# Patient Record
Sex: MALE | Race: WHITE | Employment: OTHER | URBAN - METROPOLITAN AREA
[De-identification: names, ages, dates, MRNs, and addresses within clinical notes are randomized per-mention and may not be internally consistent; named-entity substitution may affect disease eponyms.]

---

## 2017-10-24 ENCOUNTER — HOSPITAL ENCOUNTER (OUTPATIENT)
Dept: PULMONOLOGY | Age: 77
Discharge: HOME | End: 2017-10-24
Payer: MEDICARE

## 2017-10-24 PROCEDURE — 94726 PLETHYSMOGRAPHY LUNG VOLUMES: CPT

## 2017-10-24 PROCEDURE — 94060 EVALUATION OF WHEEZING: CPT

## 2017-10-24 PROCEDURE — 6360000002 HC RX W HCPCS: Performed by: INTERNAL MEDICINE

## 2017-10-24 PROCEDURE — 94729 DIFFUSING CAPACITY: CPT

## 2017-10-24 RX ORDER — ALBUTEROL SULFATE 2.5 MG/3ML
2.5 SOLUTION RESPIRATORY (INHALATION) ONCE
Status: COMPLETED | OUTPATIENT
Start: 2017-10-24 | End: 2017-10-24

## 2017-10-24 RX ADMIN — ALBUTEROL SULFATE 2.5 MG: 2.5 SOLUTION RESPIRATORY (INHALATION) at 16:27

## 2017-10-30 PROCEDURE — 94729 DIFFUSING CAPACITY: CPT | Performed by: INTERNAL MEDICINE

## 2017-10-30 PROCEDURE — 94726 PLETHYSMOGRAPHY LUNG VOLUMES: CPT | Performed by: INTERNAL MEDICINE

## 2017-10-30 PROCEDURE — 94010 BREATHING CAPACITY TEST: CPT | Performed by: INTERNAL MEDICINE

## 2018-04-24 ENCOUNTER — HOSPITAL ENCOUNTER (OUTPATIENT)
Dept: PULMONOLOGY | Age: 78
Discharge: HOME | End: 2018-04-24
Payer: MEDICARE

## 2019-01-01 ENCOUNTER — APPOINTMENT (OUTPATIENT)
Dept: GENERAL RADIOLOGY | Age: 79
DRG: 683 | End: 2019-01-01
Payer: MEDICARE

## 2019-01-01 ENCOUNTER — HOSPITAL ENCOUNTER (OUTPATIENT)
Dept: PHYSICAL THERAPY | Age: 79
Setting detail: THERAPIES SERIES
Discharge: HOME | End: 2019-07-19
Payer: MEDICARE

## 2019-01-01 ENCOUNTER — HOSPITAL ENCOUNTER (OUTPATIENT)
Dept: PHYSICAL THERAPY | Age: 79
Setting detail: THERAPIES SERIES
Discharge: HOME | End: 2019-07-09
Payer: MEDICARE

## 2019-01-01 ENCOUNTER — APPOINTMENT (OUTPATIENT)
Dept: NUCLEAR MEDICINE | Age: 79
DRG: 683 | End: 2019-01-01
Payer: MEDICARE

## 2019-01-01 ENCOUNTER — APPOINTMENT (OUTPATIENT)
Dept: CARDIAC CATH/INVASIVE PROCEDURES | Age: 79
DRG: 683 | End: 2019-01-01
Payer: MEDICARE

## 2019-01-01 ENCOUNTER — HOSPITAL ENCOUNTER (OUTPATIENT)
Dept: PHYSICAL THERAPY | Age: 79
Setting detail: THERAPIES SERIES
End: 2019-01-01
Payer: MEDICARE

## 2019-01-01 ENCOUNTER — HOSPITAL ENCOUNTER (EMERGENCY)
Age: 79
End: 2019-07-24
Attending: EMERGENCY MEDICINE
Payer: MEDICARE

## 2019-01-01 ENCOUNTER — HOSPITAL ENCOUNTER (INPATIENT)
Age: 79
LOS: 4 days | Discharge: ANOTHER HOSPITAL | DRG: 683 | End: 2019-06-19
Attending: INTERNAL MEDICINE | Admitting: INTERNAL MEDICINE
Payer: MEDICARE

## 2019-01-01 ENCOUNTER — HOSPITAL ENCOUNTER (OUTPATIENT)
Dept: PHYSICAL THERAPY | Age: 79
Setting detail: THERAPIES SERIES
Discharge: HOME | End: 2019-07-11
Payer: MEDICARE

## 2019-01-01 ENCOUNTER — APPOINTMENT (OUTPATIENT)
Dept: CT IMAGING | Age: 79
DRG: 683 | End: 2019-01-01
Payer: MEDICARE

## 2019-01-01 ENCOUNTER — HOSPITAL ENCOUNTER (OUTPATIENT)
Dept: PHYSICAL THERAPY | Age: 79
Setting detail: THERAPIES SERIES
Discharge: HOME | End: 2019-07-16
Payer: MEDICARE

## 2019-01-01 VITALS
BODY MASS INDEX: 28.77 KG/M2 | OXYGEN SATURATION: 100 % | RESPIRATION RATE: 18 BRPM | WEIGHT: 205.47 LBS | DIASTOLIC BLOOD PRESSURE: 49 MMHG | SYSTOLIC BLOOD PRESSURE: 103 MMHG | HEART RATE: 57 BPM | HEIGHT: 71 IN | TEMPERATURE: 98.2 F

## 2019-01-01 VITALS — WEIGHT: 205 LBS | BODY MASS INDEX: 28.59 KG/M2

## 2019-01-01 DIAGNOSIS — N17.9 AKI (ACUTE KIDNEY INJURY) (HCC): ICD-10-CM

## 2019-01-01 DIAGNOSIS — I46.9 CARDIAC ARREST (HCC): Primary | ICD-10-CM

## 2019-01-01 DIAGNOSIS — R55 SYNCOPE AND COLLAPSE: ICD-10-CM

## 2019-01-01 DIAGNOSIS — I47.20 VENTRICULAR TACHYCARDIA: Primary | ICD-10-CM

## 2019-01-01 DIAGNOSIS — E86.0 DEHYDRATION: ICD-10-CM

## 2019-01-01 LAB
ALBUMIN SERPL-MCNC: 2.9 G/DL (ref 3.5–4.6)
ALBUMIN SERPL-MCNC: 3.2 G/DL (ref 3.5–4.6)
ALBUMIN SERPL-MCNC: 3.3 G/DL (ref 3.5–4.6)
ALBUMIN SERPL-MCNC: 3.7 G/DL (ref 3.5–4.6)
ALP BLD-CCNC: 120 U/L (ref 35–104)
ALP BLD-CCNC: 135 U/L (ref 35–104)
ALP BLD-CCNC: 139 U/L (ref 35–104)
ALP BLD-CCNC: 165 U/L (ref 35–104)
ALT SERPL-CCNC: 13 U/L (ref 0–41)
ALT SERPL-CCNC: 15 U/L (ref 0–41)
ALT SERPL-CCNC: 18 U/L (ref 0–41)
ALT SERPL-CCNC: 20 U/L (ref 0–41)
AMPHETAMINE SCREEN, URINE: ABNORMAL
ANION GAP SERPL CALCULATED.3IONS-SCNC: 11 MEQ/L (ref 9–15)
ANION GAP SERPL CALCULATED.3IONS-SCNC: 11 MEQ/L (ref 9–15)
ANION GAP SERPL CALCULATED.3IONS-SCNC: 12 MEQ/L (ref 9–15)
ANION GAP SERPL CALCULATED.3IONS-SCNC: 12 MEQ/L (ref 9–15)
ANION GAP SERPL CALCULATED.3IONS-SCNC: 14 MEQ/L (ref 9–15)
ANION GAP SERPL CALCULATED.3IONS-SCNC: 14 MEQ/L (ref 9–15)
APTT: 124 SEC (ref 21.6–35.4)
APTT: 124 SEC (ref 21.6–35.4)
APTT: 22.9 SEC (ref 21.6–35.4)
AST SERPL-CCNC: 13 U/L (ref 0–40)
AST SERPL-CCNC: 15 U/L (ref 0–40)
AST SERPL-CCNC: 19 U/L (ref 0–40)
AST SERPL-CCNC: 19 U/L (ref 0–40)
BARBITURATE SCREEN URINE: ABNORMAL
BASOPHILS ABSOLUTE: 0 K/UL (ref 0–0.2)
BASOPHILS ABSOLUTE: 0.1 K/UL (ref 0–0.2)
BASOPHILS ABSOLUTE: 0.1 K/UL (ref 0–0.2)
BASOPHILS RELATIVE PERCENT: 0.8 %
BASOPHILS RELATIVE PERCENT: 0.8 %
BASOPHILS RELATIVE PERCENT: 0.9 %
BENZODIAZEPINE SCREEN, URINE: POSITIVE
BILIRUB SERPL-MCNC: 0.9 MG/DL (ref 0.2–0.7)
BILIRUB SERPL-MCNC: 1 MG/DL (ref 0.2–0.7)
BILIRUB SERPL-MCNC: 1.1 MG/DL (ref 0.2–0.7)
BILIRUB SERPL-MCNC: 1.3 MG/DL (ref 0.2–0.7)
BILIRUBIN URINE: NEGATIVE
BLOOD, URINE: NEGATIVE
BUN BLDV-MCNC: 32 MG/DL (ref 8–23)
BUN BLDV-MCNC: 34 MG/DL (ref 8–23)
BUN BLDV-MCNC: 35 MG/DL (ref 8–23)
BUN BLDV-MCNC: 35 MG/DL (ref 8–23)
C-REACTIVE PROTEIN, HIGH SENSITIVITY: 17.2 MG/L (ref 0–5)
CALCIUM SERPL-MCNC: 8.1 MG/DL (ref 8.5–9.9)
CALCIUM SERPL-MCNC: 8.3 MG/DL (ref 8.5–9.9)
CALCIUM SERPL-MCNC: 8.3 MG/DL (ref 8.5–9.9)
CALCIUM SERPL-MCNC: 8.4 MG/DL (ref 8.5–9.9)
CALCIUM SERPL-MCNC: 8.4 MG/DL (ref 8.5–9.9)
CALCIUM SERPL-MCNC: 8.6 MG/DL (ref 8.5–9.9)
CANNABINOID SCREEN URINE: ABNORMAL
CHLORIDE BLD-SCNC: 100 MEQ/L (ref 95–107)
CHLORIDE BLD-SCNC: 102 MEQ/L (ref 95–107)
CHLORIDE BLD-SCNC: 103 MEQ/L (ref 95–107)
CHLORIDE BLD-SCNC: 95 MEQ/L (ref 95–107)
CHLORIDE BLD-SCNC: 98 MEQ/L (ref 95–107)
CHLORIDE BLD-SCNC: 99 MEQ/L (ref 95–107)
CHOLESTEROL, TOTAL: 66 MG/DL (ref 0–199)
CHP ED QC CHECK: NORMAL
CK MB: 6.1 NG/ML (ref 0–6.7)
CLARITY: CLEAR
CO2: 25 MEQ/L (ref 20–31)
CO2: 25 MEQ/L (ref 20–31)
CO2: 26 MEQ/L (ref 20–31)
CO2: 26 MEQ/L (ref 20–31)
CO2: 29 MEQ/L (ref 20–31)
CO2: 29 MEQ/L (ref 20–31)
COCAINE METABOLITE SCREEN URINE: ABNORMAL
COLOR: YELLOW
CREAT SERPL-MCNC: 1.71 MG/DL (ref 0.7–1.2)
CREAT SERPL-MCNC: 1.88 MG/DL (ref 0.7–1.2)
CREAT SERPL-MCNC: 1.99 MG/DL (ref 0.7–1.2)
CREAT SERPL-MCNC: 2.06 MG/DL (ref 0.7–1.2)
CREAT SERPL-MCNC: 2.06 MG/DL (ref 0.7–1.2)
CREAT SERPL-MCNC: 2.08 MG/DL (ref 0.7–1.2)
CREATINE KINASE-MB INDEX: 3 % (ref 0–3.5)
D DIMER: 0.99 MG/L FEU (ref 0–0.5)
EKG ATRIAL RATE: 234 BPM
EKG ATRIAL RATE: 60 BPM
EKG ATRIAL RATE: 62 BPM
EKG ATRIAL RATE: 63 BPM
EKG P AXIS: -29 DEGREES
EKG P AXIS: -47 DEGREES
EKG P-R INTERVAL: 270 MS
EKG P-R INTERVAL: 280 MS
EKG P-R INTERVAL: 286 MS
EKG P-R INTERVAL: 294 MS
EKG Q-T INTERVAL: 478 MS
EKG Q-T INTERVAL: 492 MS
EKG Q-T INTERVAL: 502 MS
EKG Q-T INTERVAL: 510 MS
EKG QRS DURATION: 132 MS
EKG QRS DURATION: 134 MS
EKG QRS DURATION: 144 MS
EKG QRS DURATION: 148 MS
EKG QTC CALCULATION (BAZETT): 481 MS
EKG QTC CALCULATION (BAZETT): 499 MS
EKG QTC CALCULATION (BAZETT): 505 MS
EKG QTC CALCULATION (BAZETT): 521 MS
EKG R AXIS: 21 DEGREES
EKG R AXIS: 45 DEGREES
EKG R AXIS: 46 DEGREES
EKG R AXIS: 56 DEGREES
EKG T AXIS: -84 DEGREES
EKG T AXIS: 166 DEGREES
EKG T AXIS: 169 DEGREES
EKG T AXIS: 263 DEGREES
EKG VENTRICULAR RATE: 61 BPM
EKG VENTRICULAR RATE: 61 BPM
EKG VENTRICULAR RATE: 62 BPM
EKG VENTRICULAR RATE: 63 BPM
EOSINOPHILS ABSOLUTE: 0 K/UL (ref 0–0.7)
EOSINOPHILS ABSOLUTE: 0.1 K/UL (ref 0–0.7)
EOSINOPHILS ABSOLUTE: 0.1 K/UL (ref 0–0.7)
EOSINOPHILS RELATIVE PERCENT: 0.7 %
EOSINOPHILS RELATIVE PERCENT: 1.2 %
EOSINOPHILS RELATIVE PERCENT: 2.3 %
ETHANOL PERCENT: NORMAL G/DL
ETHANOL: <10 MG/DL (ref 0–0.08)
FERRITIN: 34.7 NG/ML (ref 30–400)
GFR AFRICAN AMERICAN: 37.5
GFR AFRICAN AMERICAN: 38
GFR AFRICAN AMERICAN: 38
GFR AFRICAN AMERICAN: 39.5
GFR AFRICAN AMERICAN: 42.2
GFR AFRICAN AMERICAN: 47.1
GFR NON-AFRICAN AMERICAN: 31
GFR NON-AFRICAN AMERICAN: 31.4
GFR NON-AFRICAN AMERICAN: 31.4
GFR NON-AFRICAN AMERICAN: 32.6
GFR NON-AFRICAN AMERICAN: 34.9
GFR NON-AFRICAN AMERICAN: 38.9
GLOBULIN: 2.2 G/DL (ref 2.3–3.5)
GLOBULIN: 2.5 G/DL (ref 2.3–3.5)
GLOBULIN: 2.5 G/DL (ref 2.3–3.5)
GLOBULIN: 2.7 G/DL (ref 2.3–3.5)
GLUCOSE BLD-MCNC: 104 MG/DL (ref 60–115)
GLUCOSE BLD-MCNC: 144 MG/DL (ref 60–115)
GLUCOSE BLD-MCNC: 158 MG/DL (ref 70–99)
GLUCOSE BLD-MCNC: 165 MG/DL (ref 60–115)
GLUCOSE BLD-MCNC: 168 MG/DL (ref 60–115)
GLUCOSE BLD-MCNC: 169 MG/DL (ref 70–99)
GLUCOSE BLD-MCNC: 172 MG/DL (ref 60–115)
GLUCOSE BLD-MCNC: 176 MG/DL (ref 60–115)
GLUCOSE BLD-MCNC: 176 MG/DL (ref 70–99)
GLUCOSE BLD-MCNC: 177 MG/DL (ref 60–115)
GLUCOSE BLD-MCNC: 192 MG/DL (ref 70–99)
GLUCOSE BLD-MCNC: 197 MG/DL (ref 60–115)
GLUCOSE BLD-MCNC: 203 MG/DL (ref 60–115)
GLUCOSE BLD-MCNC: 209 MG/DL (ref 60–115)
GLUCOSE BLD-MCNC: 219 MG/DL (ref 60–115)
GLUCOSE BLD-MCNC: 220 MG/DL (ref 60–115)
GLUCOSE BLD-MCNC: 236 MG/DL (ref 70–99)
GLUCOSE BLD-MCNC: 238 MG/DL (ref 60–115)
GLUCOSE BLD-MCNC: 240 MG/DL (ref 60–115)
GLUCOSE BLD-MCNC: 242 MG/DL
GLUCOSE BLD-MCNC: 258 MG/DL (ref 60–115)
GLUCOSE BLD-MCNC: 96 MG/DL (ref 70–99)
GLUCOSE URINE: NEGATIVE MG/DL
HBA1C MFR BLD: 7.7 % (ref 4.8–5.9)
HCT VFR BLD CALC: 26.2 % (ref 42–52)
HCT VFR BLD CALC: 28.6 % (ref 42–52)
HCT VFR BLD CALC: 29 % (ref 42–52)
HCT VFR BLD CALC: 29.1 % (ref 42–52)
HCT VFR BLD CALC: 29.1 % (ref 42–52)
HCT VFR BLD CALC: 31.8 % (ref 42–52)
HDLC SERPL-MCNC: 29 MG/DL (ref 40–59)
HEMOGLOBIN: 10.2 G/DL (ref 14–18)
HEMOGLOBIN: 8.7 G/DL (ref 14–18)
HEMOGLOBIN: 9.3 G/DL (ref 14–18)
HEMOGLOBIN: 9.4 G/DL (ref 14–18)
HEMOGLOBIN: 9.6 G/DL (ref 14–18)
HEMOGLOBIN: 9.6 G/DL (ref 14–18)
INR BLD: 1.2
IRON SATURATION: 8 % (ref 11–46)
IRON: 29 UG/DL (ref 59–158)
KETONES, URINE: ABNORMAL MG/DL
LDL CHOLESTEROL CALCULATED: 27 MG/DL (ref 0–129)
LEUKOCYTE ESTERASE, URINE: NEGATIVE
LV EF: 15 %
LVEF MODALITY: NORMAL
LYMPHOCYTES ABSOLUTE: 0.6 K/UL (ref 1–4.8)
LYMPHOCYTES ABSOLUTE: 0.7 K/UL (ref 1–4.8)
LYMPHOCYTES ABSOLUTE: 0.9 K/UL (ref 1–4.8)
LYMPHOCYTES RELATIVE PERCENT: 11 %
LYMPHOCYTES RELATIVE PERCENT: 11.9 %
LYMPHOCYTES RELATIVE PERCENT: 8.3 %
Lab: ABNORMAL
MAGNESIUM: 1.7 MG/DL (ref 1.7–2.4)
MAGNESIUM: 1.8 MG/DL (ref 1.7–2.4)
MAGNESIUM: 2.1 MG/DL (ref 1.7–2.4)
MCH RBC QN AUTO: 28 PG (ref 27–31.3)
MCH RBC QN AUTO: 28.1 PG (ref 27–31.3)
MCH RBC QN AUTO: 28.2 PG (ref 27–31.3)
MCH RBC QN AUTO: 28.2 PG (ref 27–31.3)
MCH RBC QN AUTO: 28.4 PG (ref 27–31.3)
MCH RBC QN AUTO: 28.6 PG (ref 27–31.3)
MCHC RBC AUTO-ENTMCNC: 32.1 % (ref 33–37)
MCHC RBC AUTO-ENTMCNC: 32.1 % (ref 33–37)
MCHC RBC AUTO-ENTMCNC: 32.2 % (ref 33–37)
MCHC RBC AUTO-ENTMCNC: 33 % (ref 33–37)
MCHC RBC AUTO-ENTMCNC: 33.1 % (ref 33–37)
MCHC RBC AUTO-ENTMCNC: 33.4 % (ref 33–37)
MCV RBC AUTO: 84.9 FL (ref 80–100)
MCV RBC AUTO: 85.3 FL (ref 80–100)
MCV RBC AUTO: 85.7 FL (ref 80–100)
MCV RBC AUTO: 86.8 FL (ref 80–100)
MCV RBC AUTO: 87.7 FL (ref 80–100)
MCV RBC AUTO: 88.6 FL (ref 80–100)
MONOCYTES ABSOLUTE: 0.4 K/UL (ref 0.2–0.8)
MONOCYTES ABSOLUTE: 0.5 K/UL (ref 0.2–0.8)
MONOCYTES ABSOLUTE: 0.6 K/UL (ref 0.2–0.8)
MONOCYTES RELATIVE PERCENT: 6.5 %
MONOCYTES RELATIVE PERCENT: 7.1 %
MONOCYTES RELATIVE PERCENT: 8.2 %
NEUTROPHILS ABSOLUTE: 4.7 K/UL (ref 1.4–6.5)
NEUTROPHILS ABSOLUTE: 6 K/UL (ref 1.4–6.5)
NEUTROPHILS ABSOLUTE: 6.1 K/UL (ref 1.4–6.5)
NEUTROPHILS RELATIVE PERCENT: 78.5 %
NEUTROPHILS RELATIVE PERCENT: 78.8 %
NEUTROPHILS RELATIVE PERCENT: 83 %
NITRITE, URINE: NEGATIVE
OPIATE SCREEN URINE: ABNORMAL
PDW BLD-RTO: 16.8 % (ref 11.5–14.5)
PDW BLD-RTO: 16.9 % (ref 11.5–14.5)
PDW BLD-RTO: 17 % (ref 11.5–14.5)
PDW BLD-RTO: 17.1 % (ref 11.5–14.5)
PDW BLD-RTO: 17.2 % (ref 11.5–14.5)
PDW BLD-RTO: 17.2 % (ref 11.5–14.5)
PERFORMED ON: ABNORMAL
PERFORMED ON: NORMAL
PH UA: 5 (ref 5–9)
PHENCYCLIDINE SCREEN URINE: ABNORMAL
PLATELET # BLD: 122 K/UL (ref 130–400)
PLATELET # BLD: 123 K/UL (ref 130–400)
PLATELET # BLD: 124 K/UL (ref 130–400)
PLATELET # BLD: 124 K/UL (ref 130–400)
PLATELET # BLD: 130 K/UL (ref 130–400)
PLATELET # BLD: 131 K/UL (ref 130–400)
POTASSIUM REFLEX MAGNESIUM: 4.4 MEQ/L (ref 3.4–4.9)
POTASSIUM SERPL-SCNC: 3.2 MEQ/L (ref 3.4–4.9)
POTASSIUM SERPL-SCNC: 3.5 MEQ/L (ref 3.4–4.9)
POTASSIUM SERPL-SCNC: 4 MEQ/L (ref 3.4–4.9)
POTASSIUM SERPL-SCNC: 4.2 MEQ/L (ref 3.4–4.9)
POTASSIUM SERPL-SCNC: 5.3 MEQ/L (ref 3.4–4.9)
PRO-BNP: 6468 PG/ML
PRO-BNP: 7975 PG/ML
PROTEIN UA: NEGATIVE MG/DL
PROTHROMBIN TIME: 12.3 SEC (ref 9–11.5)
RBC # BLD: 3.09 M/UL (ref 4.7–6.1)
RBC # BLD: 3.31 M/UL (ref 4.7–6.1)
RBC # BLD: 3.34 M/UL (ref 4.7–6.1)
RBC # BLD: 3.35 M/UL (ref 4.7–6.1)
RBC # BLD: 3.4 M/UL (ref 4.7–6.1)
RBC # BLD: 3.59 M/UL (ref 4.7–6.1)
SEDIMENTATION RATE, ERYTHROCYTE: 20 MM (ref 0–20)
SODIUM BLD-SCNC: 138 MEQ/L (ref 135–144)
SODIUM BLD-SCNC: 139 MEQ/L (ref 135–144)
SPECIFIC GRAVITY UA: 1.02 (ref 1–1.03)
T4 FREE: 1.59 NG/DL (ref 0.84–1.68)
TOTAL CK: 206 U/L (ref 0–190)
TOTAL IRON BINDING CAPACITY: 356 UG/DL (ref 178–450)
TOTAL PROTEIN: 5.4 G/DL (ref 6.3–8)
TOTAL PROTEIN: 5.5 G/DL (ref 6.3–8)
TOTAL PROTEIN: 5.7 G/DL (ref 6.3–8)
TOTAL PROTEIN: 6.4 G/DL (ref 6.3–8)
TRIGL SERPL-MCNC: 51 MG/DL (ref 0–150)
TROPONIN: 0.02 NG/ML (ref 0–0.01)
TROPONIN: 0.02 NG/ML (ref 0–0.01)
TROPONIN: 0.03 NG/ML (ref 0–0.01)
TROPONIN: <0.01 NG/ML (ref 0–0.01)
TROPONIN: <0.01 NG/ML (ref 0–0.01)
TSH REFLEX: 17.13 UIU/ML (ref 0.44–3.86)
URINE REFLEX TO CULTURE: ABNORMAL
UROBILINOGEN, URINE: 0.2 E.U./DL
WBC # BLD: 5.9 K/UL (ref 4.8–10.8)
WBC # BLD: 6.3 K/UL (ref 4.8–10.8)
WBC # BLD: 6.5 K/UL (ref 4.8–10.8)
WBC # BLD: 7.2 K/UL (ref 4.8–10.8)
WBC # BLD: 7.8 K/UL (ref 4.8–10.8)
WBC # BLD: 9.3 K/UL (ref 4.8–10.8)

## 2019-01-01 PROCEDURE — 2060000000 HC ICU INTERMEDIATE R&B

## 2019-01-01 PROCEDURE — 6370000000 HC RX 637 (ALT 250 FOR IP): Performed by: INTERNAL MEDICINE

## 2019-01-01 PROCEDURE — 71045 X-RAY EXAM CHEST 1 VIEW: CPT

## 2019-01-01 PROCEDURE — 97116 GAIT TRAINING THERAPY: CPT

## 2019-01-01 PROCEDURE — 6370000000 HC RX 637 (ALT 250 FOR IP): Performed by: PHYSICIAN ASSISTANT

## 2019-01-01 PROCEDURE — 85379 FIBRIN DEGRADATION QUANT: CPT

## 2019-01-01 PROCEDURE — 36415 COLL VENOUS BLD VENIPUNCTURE: CPT

## 2019-01-01 PROCEDURE — 96360 HYDRATION IV INFUSION INIT: CPT

## 2019-01-01 PROCEDURE — 82728 ASSAY OF FERRITIN: CPT

## 2019-01-01 PROCEDURE — 6360000002 HC RX W HCPCS: Performed by: PHYSICIAN ASSISTANT

## 2019-01-01 PROCEDURE — 2580000003 HC RX 258: Performed by: PHYSICIAN ASSISTANT

## 2019-01-01 PROCEDURE — 84484 ASSAY OF TROPONIN QUANT: CPT

## 2019-01-01 PROCEDURE — 97110 THERAPEUTIC EXERCISES: CPT

## 2019-01-01 PROCEDURE — 85027 COMPLETE CBC AUTOMATED: CPT

## 2019-01-01 PROCEDURE — 82550 ASSAY OF CK (CPK): CPT

## 2019-01-01 PROCEDURE — 6360000002 HC RX W HCPCS: Performed by: INTERNAL MEDICINE

## 2019-01-01 PROCEDURE — 93005 ELECTROCARDIOGRAM TRACING: CPT | Performed by: PHYSICIAN ASSISTANT

## 2019-01-01 PROCEDURE — 2580000003 HC RX 258

## 2019-01-01 PROCEDURE — 80053 COMPREHEN METABOLIC PANEL: CPT

## 2019-01-01 PROCEDURE — 72125 CT NECK SPINE W/O DYE: CPT

## 2019-01-01 PROCEDURE — 83540 ASSAY OF IRON: CPT

## 2019-01-01 PROCEDURE — 2580000003 HC RX 258: Performed by: INTERNAL MEDICINE

## 2019-01-01 PROCEDURE — 93005 ELECTROCARDIOGRAM TRACING: CPT | Performed by: INTERNAL MEDICINE

## 2019-01-01 PROCEDURE — 93459 L HRT ART/GRFT ANGIO: CPT | Performed by: INTERNAL MEDICINE

## 2019-01-01 PROCEDURE — 6360000002 HC RX W HCPCS: Performed by: EMERGENCY MEDICINE

## 2019-01-01 PROCEDURE — 85025 COMPLETE CBC W/AUTO DIFF WBC: CPT

## 2019-01-01 PROCEDURE — 2700000000 HC OXYGEN THERAPY PER DAY

## 2019-01-01 PROCEDURE — 82553 CREATINE MB FRACTION: CPT

## 2019-01-01 PROCEDURE — 93010 ELECTROCARDIOGRAM REPORT: CPT | Performed by: INTERNAL MEDICINE

## 2019-01-01 PROCEDURE — 6370000000 HC RX 637 (ALT 250 FOR IP): Performed by: STUDENT IN AN ORGANIZED HEALTH CARE EDUCATION/TRAINING PROGRAM

## 2019-01-01 PROCEDURE — 93017 CV STRESS TEST TRACING ONLY: CPT

## 2019-01-01 PROCEDURE — 78452 HT MUSCLE IMAGE SPECT MULT: CPT

## 2019-01-01 PROCEDURE — 99285 EMERGENCY DEPT VISIT HI MDM: CPT

## 2019-01-01 PROCEDURE — 2580000003 HC RX 258: Performed by: STUDENT IN AN ORGANIZED HEALTH CARE EDUCATION/TRAINING PROGRAM

## 2019-01-01 PROCEDURE — 83735 ASSAY OF MAGNESIUM: CPT

## 2019-01-01 PROCEDURE — 99232 SBSQ HOSP IP/OBS MODERATE 35: CPT | Performed by: INTERNAL MEDICINE

## 2019-01-01 PROCEDURE — 70450 CT HEAD/BRAIN W/O DYE: CPT

## 2019-01-01 PROCEDURE — 85730 THROMBOPLASTIN TIME PARTIAL: CPT

## 2019-01-01 PROCEDURE — 85610 PROTHROMBIN TIME: CPT

## 2019-01-01 PROCEDURE — 83036 HEMOGLOBIN GLYCOSYLATED A1C: CPT

## 2019-01-01 PROCEDURE — 97112 NEUROMUSCULAR REEDUCATION: CPT

## 2019-01-01 PROCEDURE — 99212 OFFICE O/P EST SF 10 MIN: CPT

## 2019-01-01 PROCEDURE — 83550 IRON BINDING TEST: CPT

## 2019-01-01 PROCEDURE — 6360000002 HC RX W HCPCS

## 2019-01-01 PROCEDURE — 80048 BASIC METABOLIC PNL TOTAL CA: CPT

## 2019-01-01 PROCEDURE — 2709999900 HC NON-CHARGEABLE SUPPLY

## 2019-01-01 PROCEDURE — 99284 EMERGENCY DEPT VISIT MOD MDM: CPT

## 2019-01-01 PROCEDURE — 80061 LIPID PANEL: CPT

## 2019-01-01 PROCEDURE — 84439 ASSAY OF FREE THYROXINE: CPT

## 2019-01-01 PROCEDURE — 4A023N8 MEASUREMENT OF CARDIAC SAMPLING AND PRESSURE, BILATERAL, PERCUTANEOUS APPROACH: ICD-10-PCS | Performed by: INTERNAL MEDICINE

## 2019-01-01 PROCEDURE — G0480 DRUG TEST DEF 1-7 CLASSES: HCPCS

## 2019-01-01 PROCEDURE — 99222 1ST HOSP IP/OBS MODERATE 55: CPT | Performed by: INTERNAL MEDICINE

## 2019-01-01 PROCEDURE — 92950 HEART/LUNG RESUSCITATION CPR: CPT

## 2019-01-01 PROCEDURE — C1751 CATH, INF, PER/CENT/MIDLINE: HCPCS

## 2019-01-01 PROCEDURE — 2500000003 HC RX 250 WO HCPCS: Performed by: EMERGENCY MEDICINE

## 2019-01-01 PROCEDURE — 96361 HYDRATE IV INFUSION ADD-ON: CPT

## 2019-01-01 PROCEDURE — 6360000004 HC RX CONTRAST MEDICATION: Performed by: INTERNAL MEDICINE

## 2019-01-01 PROCEDURE — C8929 TTE W OR WO FOL WCON,DOPPLER: HCPCS

## 2019-01-01 PROCEDURE — 86141 C-REACTIVE PROTEIN HS: CPT

## 2019-01-01 PROCEDURE — C1769 GUIDE WIRE: HCPCS

## 2019-01-01 PROCEDURE — 84443 ASSAY THYROID STIM HORMONE: CPT

## 2019-01-01 PROCEDURE — 3430000000 HC RX DIAGNOSTIC RADIOPHARMACEUTICAL: Performed by: INTERNAL MEDICINE

## 2019-01-01 PROCEDURE — 83880 ASSAY OF NATRIURETIC PEPTIDE: CPT

## 2019-01-01 PROCEDURE — B2151ZZ FLUOROSCOPY OF LEFT HEART USING LOW OSMOLAR CONTRAST: ICD-10-PCS | Performed by: INTERNAL MEDICINE

## 2019-01-01 PROCEDURE — A9502 TC99M TETROFOSMIN: HCPCS | Performed by: INTERNAL MEDICINE

## 2019-01-01 PROCEDURE — 80307 DRUG TEST PRSMV CHEM ANLYZR: CPT

## 2019-01-01 PROCEDURE — 6360000002 HC RX W HCPCS: Performed by: STUDENT IN AN ORGANIZED HEALTH CARE EDUCATION/TRAINING PROGRAM

## 2019-01-01 PROCEDURE — 2500000003 HC RX 250 WO HCPCS

## 2019-01-01 PROCEDURE — C1894 INTRO/SHEATH, NON-LASER: HCPCS

## 2019-01-01 PROCEDURE — 36592 COLLECT BLOOD FROM PICC: CPT

## 2019-01-01 PROCEDURE — 81003 URINALYSIS AUTO W/O SCOPE: CPT

## 2019-01-01 PROCEDURE — 96374 THER/PROPH/DIAG INJ IV PUSH: CPT

## 2019-01-01 PROCEDURE — 97162 PT EVAL MOD COMPLEX 30 MIN: CPT

## 2019-01-01 PROCEDURE — 85652 RBC SED RATE AUTOMATED: CPT

## 2019-01-01 PROCEDURE — B2111ZZ FLUOROSCOPY OF MULTIPLE CORONARY ARTERIES USING LOW OSMOLAR CONTRAST: ICD-10-PCS | Performed by: INTERNAL MEDICINE

## 2019-01-01 RX ORDER — LEVOTHYROXINE SODIUM 112 UG/1
112 TABLET ORAL DAILY
Status: DISCONTINUED | OUTPATIENT
Start: 2019-01-01 | End: 2019-01-01

## 2019-01-01 RX ORDER — FERROUS SULFATE 325(65) MG
325 TABLET ORAL
Status: DISCONTINUED | OUTPATIENT
Start: 2019-01-01 | End: 2019-01-01

## 2019-01-01 RX ORDER — DEXTROSE MONOHYDRATE 50 MG/ML
100 INJECTION, SOLUTION INTRAVENOUS PRN
Status: DISCONTINUED | OUTPATIENT
Start: 2019-01-01 | End: 2019-01-01 | Stop reason: HOSPADM

## 2019-01-01 RX ORDER — CARVEDILOL 25 MG/1
25 TABLET ORAL 2 TIMES DAILY
COMMUNITY

## 2019-01-01 RX ORDER — CHOLECALCIFEROL (VITAMIN D3) 125 MCG
500 CAPSULE ORAL DAILY
Status: DISCONTINUED | OUTPATIENT
Start: 2019-01-01 | End: 2019-01-01 | Stop reason: HOSPADM

## 2019-01-01 RX ORDER — POTASSIUM CHLORIDE 7.45 MG/ML
10 INJECTION INTRAVENOUS ONCE
Status: COMPLETED | OUTPATIENT
Start: 2019-01-01 | End: 2019-01-01

## 2019-01-01 RX ORDER — INSULIN GLARGINE 100 [IU]/ML
INJECTION, SOLUTION SUBCUTANEOUS
Qty: 1 VIAL | Refills: 3 | Status: SHIPPED | OUTPATIENT
Start: 2019-01-01

## 2019-01-01 RX ORDER — ISOSORBIDE MONONITRATE 60 MG/1
120 TABLET, EXTENDED RELEASE ORAL DAILY
Status: DISCONTINUED | OUTPATIENT
Start: 2019-01-01 | End: 2019-01-01

## 2019-01-01 RX ORDER — GLIPIZIDE 10 MG/1
10 TABLET ORAL
Status: ON HOLD | COMMUNITY
End: 2019-01-01 | Stop reason: HOSPADM

## 2019-01-01 RX ORDER — CARVEDILOL 25 MG/1
25 TABLET ORAL 2 TIMES DAILY
Status: DISCONTINUED | OUTPATIENT
Start: 2019-01-01 | End: 2019-01-01 | Stop reason: HOSPADM

## 2019-01-01 RX ORDER — SODIUM CHLORIDE 9 MG/ML
INJECTION, SOLUTION INTRAVENOUS CONTINUOUS
Status: DISCONTINUED | OUTPATIENT
Start: 2019-01-01 | End: 2019-01-01

## 2019-01-01 RX ORDER — SODIUM CHLORIDE 0.9 % (FLUSH) 0.9 %
10 SYRINGE (ML) INJECTION EVERY 12 HOURS SCHEDULED
Status: DISCONTINUED | OUTPATIENT
Start: 2019-01-01 | End: 2019-01-01 | Stop reason: HOSPADM

## 2019-01-01 RX ORDER — PANTOPRAZOLE SODIUM 20 MG/1
20 TABLET, DELAYED RELEASE ORAL DAILY
Status: DISCONTINUED | OUTPATIENT
Start: 2019-01-01 | End: 2019-01-01 | Stop reason: HOSPADM

## 2019-01-01 RX ORDER — POTASSIUM CHLORIDE 20 MEQ/1
20 TABLET, EXTENDED RELEASE ORAL ONCE
Status: COMPLETED | OUTPATIENT
Start: 2019-01-01 | End: 2019-01-01

## 2019-01-01 RX ORDER — GABAPENTIN 300 MG/1
300 CAPSULE ORAL 3 TIMES DAILY
Status: DISCONTINUED | OUTPATIENT
Start: 2019-01-01 | End: 2019-01-01 | Stop reason: HOSPADM

## 2019-01-01 RX ORDER — POTASSIUM CHLORIDE AND SODIUM CHLORIDE 450; 150 MG/100ML; MG/100ML
INJECTION, SOLUTION INTRAVENOUS CONTINUOUS
Status: DISCONTINUED | OUTPATIENT
Start: 2019-01-01 | End: 2019-01-01 | Stop reason: HOSPADM

## 2019-01-01 RX ORDER — HEPARIN SODIUM 5000 [USP'U]/ML
80 INJECTION, SOLUTION INTRAVENOUS; SUBCUTANEOUS ONCE
Status: COMPLETED | OUTPATIENT
Start: 2019-01-01 | End: 2019-01-01

## 2019-01-01 RX ORDER — DOCUSATE SODIUM 100 MG/1
100 CAPSULE, LIQUID FILLED ORAL 2 TIMES DAILY
Status: DISCONTINUED | OUTPATIENT
Start: 2019-01-01 | End: 2019-01-01 | Stop reason: HOSPADM

## 2019-01-01 RX ORDER — ASPIRIN 81 MG/1
81 TABLET, CHEWABLE ORAL DAILY
Status: DISCONTINUED | OUTPATIENT
Start: 2019-01-01 | End: 2019-01-01

## 2019-01-01 RX ORDER — SODIUM CHLORIDE 0.9 % (FLUSH) 0.9 %
10 SYRINGE (ML) INJECTION PRN
Status: DISCONTINUED | OUTPATIENT
Start: 2019-01-01 | End: 2019-01-01 | Stop reason: HOSPADM

## 2019-01-01 RX ORDER — ONDANSETRON 2 MG/ML
4 INJECTION INTRAMUSCULAR; INTRAVENOUS EVERY 6 HOURS PRN
Status: DISCONTINUED | OUTPATIENT
Start: 2019-01-01 | End: 2019-01-01 | Stop reason: HOSPADM

## 2019-01-01 RX ORDER — DEXTROSE MONOHYDRATE 25 G/50ML
12.5 INJECTION, SOLUTION INTRAVENOUS PRN
Status: DISCONTINUED | OUTPATIENT
Start: 2019-01-01 | End: 2019-01-01 | Stop reason: HOSPADM

## 2019-01-01 RX ORDER — LEVOTHYROXINE SODIUM 137 UG/1
137 TABLET ORAL DAILY
Qty: 30 TABLET | Refills: 3 | Status: SHIPPED | OUTPATIENT
Start: 2019-01-01

## 2019-01-01 RX ORDER — HEPARIN SODIUM 5000 [USP'U]/ML
40 INJECTION, SOLUTION INTRAVENOUS; SUBCUTANEOUS PRN
Status: DISCONTINUED | OUTPATIENT
Start: 2019-01-01 | End: 2019-01-01 | Stop reason: HOSPADM

## 2019-01-01 RX ORDER — FUROSEMIDE 10 MG/ML
80 INJECTION INTRAMUSCULAR; INTRAVENOUS 2 TIMES DAILY
Status: DISCONTINUED | OUTPATIENT
Start: 2019-01-01 | End: 2019-01-01 | Stop reason: HOSPADM

## 2019-01-01 RX ORDER — ASPIRIN 81 MG/1
81 TABLET ORAL DAILY
Status: DISCONTINUED | OUTPATIENT
Start: 2019-01-01 | End: 2019-01-01

## 2019-01-01 RX ORDER — DIPHENHYDRAMINE HCL 25 MG
50 TABLET ORAL ONCE
Status: DISCONTINUED | OUTPATIENT
Start: 2019-01-01 | End: 2019-01-01 | Stop reason: HOSPADM

## 2019-01-01 RX ORDER — AMIODARONE HYDROCHLORIDE 50 MG/ML
150 INJECTION, SOLUTION INTRAVENOUS ONCE
Status: COMPLETED | OUTPATIENT
Start: 2019-01-01 | End: 2019-01-01

## 2019-01-01 RX ORDER — ISOSORBIDE MONONITRATE 60 MG/1
60 TABLET, EXTENDED RELEASE ORAL DAILY
Status: DISCONTINUED | OUTPATIENT
Start: 2019-01-01 | End: 2019-01-01 | Stop reason: HOSPADM

## 2019-01-01 RX ORDER — HEPARIN SODIUM 5000 [USP'U]/ML
80 INJECTION, SOLUTION INTRAVENOUS; SUBCUTANEOUS PRN
Status: DISCONTINUED | OUTPATIENT
Start: 2019-01-01 | End: 2019-01-01 | Stop reason: HOSPADM

## 2019-01-01 RX ORDER — SIMVASTATIN 20 MG
20 TABLET ORAL NIGHTLY
Status: DISCONTINUED | OUTPATIENT
Start: 2019-01-01 | End: 2019-01-01 | Stop reason: HOSPADM

## 2019-01-01 RX ORDER — INSULIN GLARGINE 100 [IU]/ML
16 INJECTION, SOLUTION SUBCUTANEOUS NIGHTLY
Status: ON HOLD | COMMUNITY
End: 2019-01-01 | Stop reason: SDUPTHER

## 2019-01-01 RX ORDER — HEPARIN SODIUM 10000 [USP'U]/100ML
18 INJECTION, SOLUTION INTRAVENOUS CONTINUOUS
Status: DISCONTINUED | OUTPATIENT
Start: 2019-01-01 | End: 2019-01-01

## 2019-01-01 RX ORDER — TIZANIDINE 4 MG/1
4 TABLET ORAL ONCE
Status: COMPLETED | OUTPATIENT
Start: 2019-01-01 | End: 2019-01-01

## 2019-01-01 RX ORDER — ACETAMINOPHEN 325 MG/1
650 TABLET ORAL EVERY 6 HOURS PRN
Status: DISCONTINUED | OUTPATIENT
Start: 2019-01-01 | End: 2019-01-01 | Stop reason: HOSPADM

## 2019-01-01 RX ORDER — INSULIN GLARGINE 100 [IU]/ML
16 INJECTION, SOLUTION SUBCUTANEOUS NIGHTLY
Status: DISCONTINUED | OUTPATIENT
Start: 2019-01-01 | End: 2019-01-01 | Stop reason: HOSPADM

## 2019-01-01 RX ORDER — CLOPIDOGREL BISULFATE 75 MG/1
75 TABLET ORAL DAILY
Status: DISCONTINUED | OUTPATIENT
Start: 2019-01-01 | End: 2019-01-01 | Stop reason: HOSPADM

## 2019-01-01 RX ORDER — PREDNISONE 50 MG/1
50 TABLET ORAL ONCE
Status: DISCONTINUED | OUTPATIENT
Start: 2019-01-01 | End: 2019-01-01 | Stop reason: HOSPADM

## 2019-01-01 RX ORDER — ACETAMINOPHEN 325 MG/1
650 TABLET ORAL EVERY 4 HOURS PRN
Status: DISCONTINUED | OUTPATIENT
Start: 2019-01-01 | End: 2019-01-01 | Stop reason: HOSPADM

## 2019-01-01 RX ORDER — DIAZEPAM 5 MG/1
5 TABLET ORAL
Status: DISCONTINUED | OUTPATIENT
Start: 2019-01-01 | End: 2019-01-01 | Stop reason: HOSPADM

## 2019-01-01 RX ORDER — NICOTINE POLACRILEX 4 MG
15 LOZENGE BUCCAL PRN
Status: DISCONTINUED | OUTPATIENT
Start: 2019-01-01 | End: 2019-01-01 | Stop reason: HOSPADM

## 2019-01-01 RX ORDER — NITROGLYCERIN 0.4 MG/1
0.4 TABLET SUBLINGUAL EVERY 5 MIN PRN
Status: DISCONTINUED | OUTPATIENT
Start: 2019-01-01 | End: 2019-01-01 | Stop reason: HOSPADM

## 2019-01-01 RX ORDER — FERROUS SULFATE 325(65) MG
325 TABLET ORAL
Status: DISCONTINUED | OUTPATIENT
Start: 2019-01-01 | End: 2019-01-01 | Stop reason: HOSPADM

## 2019-01-01 RX ORDER — LEVOTHYROXINE SODIUM 137 UG/1
137 TABLET ORAL DAILY
Status: DISCONTINUED | OUTPATIENT
Start: 2019-01-01 | End: 2019-01-01 | Stop reason: HOSPADM

## 2019-01-01 RX ADMIN — CYANOCOBALAMIN TAB 500 MCG 500 MCG: 500 TAB at 15:57

## 2019-01-01 RX ADMIN — CYANOCOBALAMIN TAB 500 MCG 500 MCG: 500 TAB at 08:33

## 2019-01-01 RX ADMIN — ISOSORBIDE MONONITRATE 60 MG: 60 TABLET, EXTENDED RELEASE ORAL at 09:09

## 2019-01-01 RX ADMIN — LEVOTHYROXINE SODIUM 137 MCG: 137 TABLET ORAL at 06:00

## 2019-01-01 RX ADMIN — CARVEDILOL 25 MG: 25 TABLET, FILM COATED ORAL at 21:53

## 2019-01-01 RX ADMIN — CLOPIDOGREL BISULFATE 75 MG: 75 TABLET ORAL at 08:51

## 2019-01-01 RX ADMIN — CLOPIDOGREL BISULFATE 75 MG: 75 TABLET ORAL at 22:42

## 2019-01-01 RX ADMIN — ASPIRIN 81 MG: 81 TABLET, COATED ORAL at 08:33

## 2019-01-01 RX ADMIN — ENOXAPARIN SODIUM 40 MG: 40 INJECTION SUBCUTANEOUS at 20:54

## 2019-01-01 RX ADMIN — PANTOPRAZOLE SODIUM 20 MG: 20 TABLET, DELAYED RELEASE ORAL at 08:25

## 2019-01-01 RX ADMIN — ACETAMINOPHEN 650 MG: 325 TABLET ORAL at 21:05

## 2019-01-01 RX ADMIN — DOCUSATE SODIUM 100 MG: 100 CAPSULE, LIQUID FILLED ORAL at 20:54

## 2019-01-01 RX ADMIN — CLOPIDOGREL BISULFATE 75 MG: 75 TABLET ORAL at 09:09

## 2019-01-01 RX ADMIN — GABAPENTIN 300 MG: 300 CAPSULE ORAL at 20:56

## 2019-01-01 RX ADMIN — INSULIN GLARGINE 16 UNITS: 100 INJECTION, SOLUTION SUBCUTANEOUS at 22:16

## 2019-01-01 RX ADMIN — REGADENOSON 0.4 MG: 0.08 INJECTION, SOLUTION INTRAVENOUS at 13:31

## 2019-01-01 RX ADMIN — CYANOCOBALAMIN TAB 500 MCG 500 MCG: 500 TAB at 08:25

## 2019-01-01 RX ADMIN — SIMVASTATIN 20 MG: 20 TABLET, FILM COATED ORAL at 22:42

## 2019-01-01 RX ADMIN — ISOSORBIDE MONONITRATE 60 MG: 60 TABLET, EXTENDED RELEASE ORAL at 08:51

## 2019-01-01 RX ADMIN — LEVOTHYROXINE SODIUM 137 MCG: 137 TABLET ORAL at 06:42

## 2019-01-01 RX ADMIN — INSULIN GLARGINE 16 UNITS: 100 INJECTION, SOLUTION SUBCUTANEOUS at 22:09

## 2019-01-01 RX ADMIN — CARVEDILOL 25 MG: 25 TABLET, FILM COATED ORAL at 08:51

## 2019-01-01 RX ADMIN — HEPARIN SODIUM AND DEXTROSE 18 UNITS/KG/HR: 10000; 5 INJECTION INTRAVENOUS at 22:10

## 2019-01-01 RX ADMIN — TIZANIDINE 4 MG: 4 TABLET ORAL at 11:50

## 2019-01-01 RX ADMIN — GABAPENTIN 300 MG: 300 CAPSULE ORAL at 08:51

## 2019-01-01 RX ADMIN — EPINEPHRINE 1 MG: 0.1 INJECTION, SOLUTION ENDOTRACHEAL; INTRACARDIAC; INTRAVENOUS at 08:29

## 2019-01-01 RX ADMIN — DOCUSATE SODIUM 100 MG: 100 CAPSULE, LIQUID FILLED ORAL at 15:57

## 2019-01-01 RX ADMIN — SODIUM CHLORIDE: 9 INJECTION, SOLUTION INTRAVENOUS at 16:28

## 2019-01-01 RX ADMIN — GABAPENTIN 300 MG: 300 CAPSULE ORAL at 15:56

## 2019-01-01 RX ADMIN — SIMVASTATIN 20 MG: 20 TABLET, FILM COATED ORAL at 21:53

## 2019-01-01 RX ADMIN — GABAPENTIN 300 MG: 300 CAPSULE ORAL at 13:38

## 2019-01-01 RX ADMIN — DEXTROSE MONOHYDRATE 0.5 MG/MIN: 50 INJECTION, SOLUTION INTRAVENOUS at 21:52

## 2019-01-01 RX ADMIN — SODIUM CHLORIDE: 9 INJECTION, SOLUTION INTRAVENOUS at 08:51

## 2019-01-01 RX ADMIN — GABAPENTIN 300 MG: 300 CAPSULE ORAL at 18:45

## 2019-01-01 RX ADMIN — POTASSIUM CHLORIDE 10 MEQ: 7.46 INJECTION, SOLUTION INTRAVENOUS at 10:58

## 2019-01-01 RX ADMIN — ENOXAPARIN SODIUM 40 MG: 40 INJECTION SUBCUTANEOUS at 22:43

## 2019-01-01 RX ADMIN — IRON SUCROSE 200 MG: 20 INJECTION, SOLUTION INTRAVENOUS at 15:56

## 2019-01-01 RX ADMIN — MAGNESIUM OXIDE TAB 400 MG (241.3 MG ELEMENTAL MG) 400 MG: 400 (241.3 MG) TAB at 09:08

## 2019-01-01 RX ADMIN — GABAPENTIN 300 MG: 300 CAPSULE ORAL at 22:42

## 2019-01-01 RX ADMIN — Medication 10 ML: at 09:16

## 2019-01-01 RX ADMIN — LEVOTHYROXINE SODIUM 112 MCG: 112 TABLET ORAL at 06:28

## 2019-01-01 RX ADMIN — CARVEDILOL 25 MG: 25 TABLET, FILM COATED ORAL at 08:25

## 2019-01-01 RX ADMIN — DOCUSATE SODIUM 100 MG: 100 CAPSULE, LIQUID FILLED ORAL at 08:33

## 2019-01-01 RX ADMIN — DOCUSATE SODIUM 100 MG: 100 CAPSULE, LIQUID FILLED ORAL at 20:56

## 2019-01-01 RX ADMIN — FUROSEMIDE 80 MG: 10 INJECTION, SOLUTION INTRAMUSCULAR; INTRAVENOUS at 15:57

## 2019-01-01 RX ADMIN — GABAPENTIN 300 MG: 300 CAPSULE ORAL at 09:09

## 2019-01-01 RX ADMIN — PANTOPRAZOLE SODIUM 20 MG: 20 TABLET, DELAYED RELEASE ORAL at 09:09

## 2019-01-01 RX ADMIN — MAGNESIUM OXIDE TAB 400 MG (241.3 MG ELEMENTAL MG) 400 MG: 400 (241.3 MG) TAB at 08:33

## 2019-01-01 RX ADMIN — AMIODARONE HYDROCHLORIDE 150 MG: 150 INJECTION, SOLUTION INTRAVENOUS at 18:42

## 2019-01-01 RX ADMIN — Medication 10 ML: at 21:07

## 2019-01-01 RX ADMIN — GABAPENTIN 300 MG: 300 CAPSULE ORAL at 13:51

## 2019-01-01 RX ADMIN — Medication 10 ML: at 08:25

## 2019-01-01 RX ADMIN — GABAPENTIN 300 MG: 300 CAPSULE ORAL at 08:33

## 2019-01-01 RX ADMIN — SIMVASTATIN 20 MG: 20 TABLET, FILM COATED ORAL at 20:56

## 2019-01-01 RX ADMIN — DOCUSATE SODIUM 100 MG: 100 CAPSULE, LIQUID FILLED ORAL at 21:53

## 2019-01-01 RX ADMIN — PERFLUTREN 1.87 MG: 6.52 INJECTION, SUSPENSION INTRAVENOUS at 10:13

## 2019-01-01 RX ADMIN — CARVEDILOL 25 MG: 25 TABLET, FILM COATED ORAL at 22:41

## 2019-01-01 RX ADMIN — FERROUS SULFATE TAB 325 MG (65 MG ELEMENTAL FE) 325 MG: 325 (65 FE) TAB at 09:09

## 2019-01-01 RX ADMIN — FERROUS SULFATE TAB 325 MG (65 MG ELEMENTAL FE) 325 MG: 325 (65 FE) TAB at 08:33

## 2019-01-01 RX ADMIN — MAGNESIUM OXIDE TAB 400 MG (241.3 MG ELEMENTAL MG) 400 MG: 400 (241.3 MG) TAB at 08:51

## 2019-01-01 RX ADMIN — Medication 10 ML: at 11:17

## 2019-01-01 RX ADMIN — FUROSEMIDE 5 MG/HR: 10 INJECTION, SOLUTION INTRAVENOUS at 15:11

## 2019-01-01 RX ADMIN — DEXTROSE MONOHYDRATE 1 MG/MIN: 50 INJECTION, SOLUTION INTRAVENOUS at 01:30

## 2019-01-01 RX ADMIN — CYANOCOBALAMIN TAB 500 MCG 500 MCG: 500 TAB at 22:41

## 2019-01-01 RX ADMIN — DOCUSATE SODIUM 100 MG: 100 CAPSULE, LIQUID FILLED ORAL at 08:25

## 2019-01-01 RX ADMIN — POTASSIUM CHLORIDE AND SODIUM CHLORIDE: 450; 150 INJECTION, SOLUTION INTRAVENOUS at 18:44

## 2019-01-01 RX ADMIN — DOCUSATE SODIUM 100 MG: 100 CAPSULE, LIQUID FILLED ORAL at 09:08

## 2019-01-01 RX ADMIN — Medication 10 ML: at 09:09

## 2019-01-01 RX ADMIN — INSULIN GLARGINE 16 UNITS: 100 INJECTION, SOLUTION SUBCUTANEOUS at 21:00

## 2019-01-01 RX ADMIN — ACETAMINOPHEN 650 MG: 325 TABLET ORAL at 09:34

## 2019-01-01 RX ADMIN — ISOSORBIDE MONONITRATE 60 MG: 60 TABLET, EXTENDED RELEASE ORAL at 11:50

## 2019-01-01 RX ADMIN — PANTOPRAZOLE SODIUM 20 MG: 20 TABLET, DELAYED RELEASE ORAL at 08:51

## 2019-01-01 RX ADMIN — Medication 10 ML: at 08:52

## 2019-01-01 RX ADMIN — SODIUM CHLORIDE 125 ML/HR: 9 INJECTION, SOLUTION INTRAVENOUS at 06:45

## 2019-01-01 RX ADMIN — POTASSIUM CHLORIDE 20 MEQ: 1500 TABLET, EXTENDED RELEASE ORAL at 09:31

## 2019-01-01 RX ADMIN — SODIUM BICARBONATE 50 MEQ: 84 INJECTION, SOLUTION INTRAVENOUS at 08:30

## 2019-01-01 RX ADMIN — CARVEDILOL 25 MG: 25 TABLET, FILM COATED ORAL at 08:33

## 2019-01-01 RX ADMIN — CARVEDILOL 25 MG: 25 TABLET, FILM COATED ORAL at 20:56

## 2019-01-01 RX ADMIN — FUROSEMIDE 80 MG: 10 INJECTION, SOLUTION INTRAMUSCULAR; INTRAVENOUS at 08:24

## 2019-01-01 RX ADMIN — LEVOTHYROXINE SODIUM 112 MCG: 112 TABLET ORAL at 09:11

## 2019-01-01 RX ADMIN — TIZANIDINE 4 MG: 4 TABLET ORAL at 16:58

## 2019-01-01 RX ADMIN — ISOSORBIDE MONONITRATE 60 MG: 60 TABLET, EXTENDED RELEASE ORAL at 08:25

## 2019-01-01 RX ADMIN — ENOXAPARIN SODIUM 40 MG: 40 INJECTION SUBCUTANEOUS at 21:52

## 2019-01-01 RX ADMIN — PANTOPRAZOLE SODIUM 20 MG: 20 TABLET, DELAYED RELEASE ORAL at 22:42

## 2019-01-01 RX ADMIN — Medication 10 ML: at 13:32

## 2019-01-01 RX ADMIN — TETROFOSMIN 11 MILLICURIE: 1.38 INJECTION, POWDER, LYOPHILIZED, FOR SOLUTION INTRAVENOUS at 11:17

## 2019-01-01 RX ADMIN — DEXTROSE MONOHYDRATE 1 MG/MIN: 50 INJECTION, SOLUTION INTRAVENOUS at 17:54

## 2019-01-01 RX ADMIN — CYANOCOBALAMIN TAB 500 MCG 500 MCG: 500 TAB at 09:09

## 2019-01-01 RX ADMIN — Medication 10 ML: at 13:31

## 2019-01-01 RX ADMIN — CLOPIDOGREL BISULFATE 75 MG: 75 TABLET ORAL at 08:33

## 2019-01-01 RX ADMIN — TETROFOSMIN 30.2 MILLICURIE: 1.38 INJECTION, POWDER, LYOPHILIZED, FOR SOLUTION INTRAVENOUS at 13:31

## 2019-01-01 RX ADMIN — CARVEDILOL 25 MG: 25 TABLET, FILM COATED ORAL at 20:54

## 2019-01-01 RX ADMIN — PANTOPRAZOLE SODIUM 20 MG: 20 TABLET, DELAYED RELEASE ORAL at 08:33

## 2019-01-01 RX ADMIN — FERROUS SULFATE TAB 325 MG (65 MG ELEMENTAL FE) 325 MG: 325 (65 FE) TAB at 15:57

## 2019-01-01 RX ADMIN — POTASSIUM CHLORIDE 10 MEQ: 7.46 INJECTION, SOLUTION INTRAVENOUS at 09:32

## 2019-01-01 RX ADMIN — CLOPIDOGREL BISULFATE 75 MG: 75 TABLET ORAL at 08:25

## 2019-01-01 RX ADMIN — GABAPENTIN 300 MG: 300 CAPSULE ORAL at 21:53

## 2019-01-01 RX ADMIN — CARVEDILOL 25 MG: 25 TABLET, FILM COATED ORAL at 09:08

## 2019-01-01 RX ADMIN — POTASSIUM CHLORIDE AND SODIUM CHLORIDE: 450; 150 INJECTION, SOLUTION INTRAVENOUS at 16:58

## 2019-01-01 RX ADMIN — Medication 5 MG: at 20:54

## 2019-01-01 RX ADMIN — IRON SUCROSE 200 MG: 20 INJECTION, SOLUTION INTRAVENOUS at 13:51

## 2019-01-01 RX ADMIN — HEPARIN SODIUM 7200 UNITS: 5000 INJECTION, SOLUTION INTRAVENOUS; SUBCUTANEOUS at 22:11

## 2019-01-01 RX ADMIN — SIMVASTATIN 20 MG: 20 TABLET, FILM COATED ORAL at 20:54

## 2019-01-01 RX ADMIN — GABAPENTIN 300 MG: 300 CAPSULE ORAL at 22:10

## 2019-01-01 RX ADMIN — GABAPENTIN 300 MG: 300 CAPSULE ORAL at 08:25

## 2019-01-01 RX ADMIN — MAGNESIUM OXIDE TAB 400 MG (241.3 MG ELEMENTAL MG) 400 MG: 400 (241.3 MG) TAB at 08:25

## 2019-01-01 ASSESSMENT — ENCOUNTER SYMPTOMS
RHINORRHEA: 0
BACK PAIN: 0
TROUBLE SWALLOWING: 0
SORE THROAT: 0
COUGH: 0
EYE DISCHARGE: 0
CONSTIPATION: 0
ABDOMINAL DISTENTION: 0
STRIDOR: 0
COLOR CHANGE: 0
ABDOMINAL PAIN: 0
ABDOMINAL DISTENTION: 0
CHEST TIGHTNESS: 0
VOICE CHANGE: 0
SHORTNESS OF BREATH: 0
EYE DISCHARGE: 0

## 2019-01-01 ASSESSMENT — PAIN DESCRIPTION - LOCATION
LOCATION: SHOULDER
LOCATION: NECK
LOCATION: BACK
LOCATION: HEAD

## 2019-01-01 ASSESSMENT — PAIN SCALES - GENERAL
PAINLEVEL_OUTOF10: 0
PAINLEVEL_OUTOF10: 2
PAINLEVEL_OUTOF10: 4
PAINLEVEL_OUTOF10: 5
PAINLEVEL_OUTOF10: 0
PAINLEVEL_OUTOF10: 0
PAINLEVEL_OUTOF10: 2
PAINLEVEL_OUTOF10: 7
PAINLEVEL_OUTOF10: 0
PAINLEVEL_OUTOF10: 7
PAINLEVEL_OUTOF10: 0
PAINLEVEL_OUTOF10: 0

## 2019-01-01 ASSESSMENT — PAIN DESCRIPTION - ORIENTATION
ORIENTATION: RIGHT
ORIENTATION: RIGHT;LEFT

## 2019-01-01 ASSESSMENT — PAIN DESCRIPTION - PAIN TYPE
TYPE: ACUTE PAIN
TYPE: ACUTE PAIN

## 2019-01-01 ASSESSMENT — PAIN DESCRIPTION - DESCRIPTORS
DESCRIPTORS: ACHING
DESCRIPTORS: SHARP

## 2019-06-15 PROBLEM — Z45.02 AICD DISCHARGE: Status: ACTIVE | Noted: 2019-01-01

## 2019-06-15 NOTE — H&P
Medical: Not on file     Non-medical: Not on file   Tobacco Use    Smoking status: Never Smoker   Substance and Sexual Activity    Alcohol use: No    Drug use: Not on file    Sexual activity: Not on file   Lifestyle    Physical activity:     Days per week: Not on file     Minutes per session: Not on file    Stress: Not on file   Relationships    Social connections:     Talks on phone: Not on file     Gets together: Not on file     Attends Episcopal service: Not on file     Active member of club or organization: Not on file     Attends meetings of clubs or organizations: Not on file     Relationship status: Not on file    Intimate partner violence:     Fear of current or ex partner: Not on file     Emotionally abused: Not on file     Physically abused: Not on file     Forced sexual activity: Not on file   Other Topics Concern    Not on file   Social History Narrative    Not on file     MEDICATIONS:   Prior to Admission medications    Medication Sig Start Date End Date Taking? Authorizing Provider   glyBURIDE (DIABETA) 5 MG tablet Take 5 mg by mouth    Historical Provider, MD   metFORMIN (GLUCOPHAGE) 850 MG tablet Take 850 mg by mouth 8/18/14   Historical Provider, MD   isosorbide mononitrate (IMDUR) 120 MG CR tablet Take 120 mg by mouth 6/7/11   Historical Provider, MD   levothyroxine (SYNTHROID) 112 MCG tablet Take 1 tab daily 10/22/14   Historical Provider, MD   gabapentin (NEURONTIN) 300 MG capsule Take one(1) tablet three times daily.  11/8/04   Historical Provider, MD   pantoprazole (PROTONIX) 40 MG tablet Take 40 mg by mouth 3/17/15 4/16/15  Historical Provider, MD   simvastatin (ZOCOR) 20 MG tablet Take by mouth 9/3/10   Historical Provider, MD   HYDROcodone-acetaminophen (NORCO) 5-325 MG per tablet Take 1 tablet by mouth 4/11/14   Historical Provider, MD   nitroGLYCERIN (NITROSTAT) 0.4 MG SL tablet Place 0.4 mg under the tongue    Historical Provider, MD   Magnesium Oxide 400 MG CAPS Take 400 mg midline, skin normal and no carotid bruits  LUNGS:  no increased work of breathing and clear to auscultation  CARDIOVASCULAR:  normal apical pulses and normal S1 and S2  ABDOMEN:  normal bowel sounds and non-tender  MUSCULOSKELETAL:  Healing wounds to L foot 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      LABS:    Recent Results (from the past 24 hour(s))   POCT Glucose    Collection Time: 06/15/19  2:43 PM   Result Value Ref Range    Glucose 242 mg/dL    QC OK? ok    Ethanol    Collection Time: 06/15/19  3:07 PM   Result Value Ref Range    Ethanol Lvl <10 mg/dL    Ethanol percent Not indicated G/dL   Comprehensive Metabolic Panel    Collection Time: 06/15/19  3:07 PM   Result Value Ref Range    Sodium 138 135 - 144 mEq/L    Potassium 5.3 (H) 3.4 - 4.9 mEq/L    Chloride 100 95 - 107 mEq/L    CO2 26 20 - 31 mEq/L    Anion Gap 12 9 - 15 mEq/L    Glucose 236 (H) 70 - 99 mg/dL    BUN 34 (H) 8 - 23 mg/dL    CREATININE 1.99 (H) 0.70 - 1.20 mg/dL    GFR Non- 32.7 (L) >60    GFR  39.5 (L) >60    Calcium 8.4 (L) 8.5 - 9.9 mg/dL    Total Protein 6.4 6.3 - 8.0 g/dL    Alb 3.7 3.5 - 4.6 g/dL    Total Bilirubin 0.9 (H) 0.2 - 0.7 mg/dL    Alkaline Phosphatase 165 (H) 35 - 104 U/L    ALT 20 0 - 41 U/L    AST 19 0 - 40 U/L    Globulin 2.7 2.3 - 3.5 g/dL   CBC Auto Differential    Collection Time: 06/15/19  3:07 PM   Result Value Ref Range    WBC 5.9 4.8 - 10.8 K/uL    RBC 3.59 (L) 4.70 - 6.10 M/uL    Hemoglobin 10.2 (L) 14.0 - 18.0 g/dL    Hematocrit 31.8 (L) 42.0 - 52.0 %    MCV 88.6 80.0 - 100.0 fL    MCH 28.4 27.0 - 31.3 pg    MCHC 32.1 (L) 33.0 - 37.0 %    RDW 17.2 (H) 11.5 - 14.5 %    Platelets 982 920 - 768 K/uL    Neutrophils % 78.5 %    Lymphocytes % 11.9 %    Monocytes % 6.5 %    Eosinophils % 2.3 %    Basophils % 0.8 %    Neutrophils # 4.7 1.4 - 6.5 K/uL    Lymphocytes # 0.7 (L) 1.0 - 4.8 K/uL    Monocytes # 0.4 0.2 - 0.8 K/uL    Eosinophils # 0.1 0.0 - 0.7 K/uL    Basophils # 0.0 0.0 - 0.2 K/uL   Troponin    Collection Time: 06/15/19  3:07 PM   Result Value Ref Range    Troponin <0.010 0.000 - 0.010 ng/mL   CK    Collection Time: 06/15/19  3:07 PM   Result Value Ref Range    Total  (H) 0 - 190 U/L   APTT    Collection Time: 06/15/19  3:07 PM   Result Value Ref Range    aPTT 22.9 21.6 - 35.4 sec   Protime-INR    Collection Time: 06/15/19  3:07 PM   Result Value Ref Range    Protime 12.3 (H) 9.0 - 11.5 sec    INR 1.2    CKMB & RELATIVE PERCENT    Collection Time: 06/15/19  3:07 PM   Result Value Ref Range    CK-MB 6.1 0.0 - 6.7 ng/mL    CK-MB Index 3.0 0.0 - 3.5 %   Magnesium    Collection Time: 06/15/19  3:07 PM   Result Value Ref Range    Magnesium 2.1 1.7 - 2.4 mg/dL       IMAGING:  Ct Head Wo Contrast    Result Date: 6/15/2019  EXAMINATION: CT HEAD WO CONTRAST  DATE AND TIME:6/15/2019 2:45 PM CLINICAL HISTORY: Severe headache.  syncope, fall  COMPARISON: April 28, 2016 TECHNIQUE:Axial CT from skull base to vertex without  contrast..  All CT scans at this facility use dose modulation, iterative reconstruction, and/or weight based dosing when appropriate to reduce radiation dose to as low as reasonably achievable. FINDINGS CSF spaces: There is diffuse prominence of the CSF spaces of the ventricles and cerebral convexities consistent with significant volume loss. However there can be significant overlap in the appearance of age-related changes and neurodegenerative disease. Brain parenchyma:  There is no parenchymal mass, or mass effect signs of acute infarct, acute hemorrhage or amputations      Admit 1w/tele  Consult cardiology  Amiodarone drip to continue  Ortho VS now and qam  Home meds per MAR  Glucose checks w/SSI  Cbc, bmp, Mg in am  Consult wound care      Plan of care discussed with: patient    SIGNATURE: Nickey Duverney, PA-C  DATE: Lauren 15, 2019  TIME: 6:53 PM     Dr. Erika Prakash MD - supervising physician

## 2019-06-15 NOTE — ED TRIAGE NOTES
Per [facility], pt was mowing grass on a riding mower when he fell off. Family witnessed fall. Pt does not remember falling off the mower. Admits to loss of consciousness.

## 2019-06-15 NOTE — ED PROVIDER NOTES
[address] [facility] ED  eMERGENCY dEPARTMENT eNCOUnter      Pt Name: Palmira Yanez  MRN: [id number]  Emilygfmiko 1940  Date of evaluation: 6/15/2019  Provider: Whit Cantrell PA-C    CHIEF COMPLAINT       Chief Complaint   Patient presents with    Fall     fell from           HISTORY OF PRESENT ILLNESS   (Location/Symptom, Timing/Onset,Context/Setting, Quality, Duration, Modifying Factors, Severity)  Note limiting factors. Palmira Yanez is a 67 y.o. male who presents to the emergency department for complaint of syncope while mowing his lawn. According to EMS family states patient was mowing the lawn he passed out and fell onto the ground they state he was unresponsive for a very brief period of time. Patient does not recall the specific incident he states he did not have chest pain shortness of breath or dizziness prior to the event he states he remembers waking up on the ground, with complaint of neck pain. Is no numbness or tingling no bowel or bladder dysfunction. States he had recently been admitted to the hospital for GI bleeding, without definable cause. Patient at this time denies any chest pain no shortness of breath no nausea vomiting dizziness no numbness or tingling no weakness he states he just feels generally fatigued at this point. States been eating and drinking as normal denies any alcohol consumption recently. He rates his overall pain at this time is a 5 out of 10 to the lateral aspects of the neck    HPI    NursingNotes were reviewed. REVIEW OF SYSTEMS    (2-9 systems for level 4, 10 or more for level 5)     Review of Systems   Constitutional: Positive for fatigue. Negative for activity change, appetite change and fever. HENT: Negative for congestion, ear discharge, ear pain, nosebleeds, rhinorrhea, sore throat, tinnitus, trouble swallowing and voice change. Eyes: Negative for discharge. Respiratory: Negative for cough, shortness of breath and stridor. Cardiovascular: Negative for chest pain, palpitations and leg swelling. Gastrointestinal: Negative for abdominal distention, abdominal pain and constipation. Genitourinary: Negative for difficulty urinating, dysuria and urgency. Musculoskeletal: Positive for neck pain. Negative for arthralgias, back pain, gait problem, joint swelling and myalgias. Skin: Negative for color change, pallor, rash and wound. Neurological: Positive for syncope. Negative for dizziness, tremors, weakness, numbness and headaches. Psychiatric/Behavioral: Negative for agitation and confusion. Except as noted above the remainder of the review of systems was reviewed and negative. PAST MEDICAL HISTORY     Past Medical History:   Diagnosis Date    Hyperlipidemia     Hypertension     Hypothyroidism     Type II or unspecified type diabetes mellitus without mention of complication, not stated as uncontrolled          SURGICALHISTORY       Past Surgical History:   Procedure Laterality Date    CHOLECYSTECTOMY      COLONOSCOPY  3/21/14    R[facility]ANNA    CORONARY ANGIOPLASTY WITH STENT PLACEMENT      CORONARY ARTERY BYPASS GRAFT      UPPER GASTROINTESTINAL ENDOSCOPY  3/20/14    RHIANNA         CURRENT MEDICATIONS       Previous Medications    AMIODARONE (CORDARONE) 200 MG TABLET    Take 200 mg by mouth daily    ASCORBIC ACID (VITAMIN C) 500 MG CAPS    Take by mouth    ASPIRIN 81 MG TABLET    Take 81 mg by mouth daily    CLOPIDOGREL (PLAVIX) 75 MG TABLET    Take 75 mg by mouth daily    COENZYME Q10 (CO Q 10 PO)    Take by mouth    DOCUSATE SODIUM (COLACE) 100 MG CAPSULE    Take 100 mg by mouth 2 times daily    FERROUS SULFATE 325 (65 FE) MG TABLET    Take 325 mg by mouth daily (with breakfast)    FUROSEMIDE (LASIX) 20 MG TABLET    Take 20 mg by mouth 2 times daily    GABAPENTIN (NEURONTIN) 300 MG CAPSULE    Take one(1) tablet three times daily.     GLYBURIDE (DIABETA) 5 MG TABLET    Take 5 mg by mouth HYDROCODONE-ACETAMINOPHEN (NORCO) 5-325 MG PER TABLET    Take 1 tablet by mouth    ISOSORBIDE MONONITRATE (IMDUR) 120 MG CR TABLET    Take 120 mg by mouth    KRILL OIL PO    Take by mouth    LEVOTHYROXINE (SYNTHROID) 112 MCG TABLET    Take 1 tab daily    MAGNESIUM OXIDE 400 MG CAPS    Take 400 mg by mouth    METFORMIN (GLUCOPHAGE) 850 MG TABLET    Take 850 mg by mouth    NITROGLYCERIN (NITROSTAT) 0.4 MG SL TABLET    Place 0.4 mg under the tongue    PANTOPRAZOLE (PROTONIX) 20 MG TABLET    Take 20 mg by mouth daily    PANTOPRAZOLE (PROTONIX) 40 MG TABLET    Take 40 mg by mouth    SIMVASTATIN (ZOCOR) 20 MG TABLET    Take by mouth    SUCRALFATE (CARAFATE) 1 GM TABLET    Take 1 g by mouth 4 times daily    VALSARTAN (DIOVAN) 80 MG TABLET    Take by mouth    VITAMIN B-12 (CYANOCOBALAMIN) 500 MCG TABLET    Take one(1) tablet daily. VITAMIN D-3 (COLECALCIFEROL) 400 UNITS TABS    Take by mouth       ALLERGIES     Ranolazine; Altace [ramipril]; Amiodarone; Lipitor [atorvastatin];  Lisinopril; and Penicillins    FAMILY HISTORY       Family History   Problem Relation Age of Onset    Heart Attack Mother     Stroke Father     Heart Attack Brother     Heart Disease Brother           SOCIAL HISTORY       Social History     Socioeconomic History    Marital status:      Spouse name: None    Number of children: None    Years of education: None    Highest education level: None   Occupational History    None   Social Needs    Financial resource strain: None    Food insecurity:     Worry: None     Inability: None    Transportation needs:     Medical: None     Non-medical: None   Tobacco Use    Smoking status: Never Smoker   Substance and Sexual Activity    Alcohol use: No    Drug use: None    Sexual activity: None   Lifestyle    Physical activity:     Days per week: None     Minutes per session: None    Stress: None   Relationships    Social connections:     Talks on phone: None     Gets together: None Attends Taoist service: None     Active member of club or organization: None     Attends meetings of clubs or organizations: None     Relationship status: None    Intimate partner violence:     Fear of current or ex partner: None     Emotionally abused: None     Physically abused: None     Forced sexual activity: None   Other Topics Concern    None   Social History Narrative    None       SCREENINGS    Monson Coma Scale  Eye Opening: Spontaneous  Best Verbal Response: Oriented  Best Motor Response: Obeys commands  Monson Coma Scale Score: 15 @FLOW([id number])@      PHYSICAL EXAM    (up to 7 for level 4, 8 or more for level 5)     ED Triage Vitals   BP Temp Temp src Pulse Resp SpO2 Height Weight   -- -- -- -- -- -- -- --       Physical Exam   Constitutional: He is oriented to person, place, and time. He appears well-developed and well-nourished. HENT:   Head: Normocephalic. Minor abrasion noted to ear right side no active bleeding small skin tear. Drainage from the ear itself tabetic membranes are intact. Eyes: Pupils are equal, round, and reactive to light. EOM are normal.   Neck: Normal range of motion. No JVD present. No tracheal deviation present. Patient complains of pain to bilateral lateral aspect of his neck there is no midline tenderness there is no crepitus there is no step-offs or deformity there is no cut scrapes or abrasions noted to the neck. Cardiovascular: Normal rate. Pulmonary/Chest: Effort normal and breath sounds normal. No respiratory distress. He has no wheezes. He has no rales. He exhibits no tenderness. Abdominal: Soft. Bowel sounds are normal. He exhibits no distension and no mass. There is no tenderness. There is no guarding. Musculoskeletal: Normal range of motion. He exhibits no edema or deformity. he is moving all extremities spontaneously, no drift upper or lower extremities   Neurological: He is alert and oriented to person, place, and time.  Coordination normal. Is alert and oriented x3 there is no slurred speech there is no facial droop there is no drift upper or lower extremities   Skin: Skin is warm. There is pallor. Skin is pale in color. Nondiaphoretic       DIAGNOSTIC RESULTS     EKG: All EKG's are interpreted by the Emergency Department Physician who either signs or Co-signsthis chart in the absence of a cardiologist.    EKG shows atrial paced rhythm at 61 bpm there is T wave inversions in leads II, III, aVF no other acute ST segment abnormality no ventricular ectopy QT is a 478 ms. RADIOLOGY:   Non-plain filmimages such as CT, Ultrasound and MRI are read by the radiologist. Plain radiographic images are visualized and preliminarily interpreted by the emergency physician with the below findings:    CT scan of brain shows no acute intracranial pathology    CT of the cervical spine shows no acute cervical spine abnormality. Tray of the chest shows cardiomegaly otherwise no acute pulmonary process    Interpretation per the Radiologist below, if available at the time ofthis note:    CT Head WO Contrast   Final Result   NO ACUTE INTRACRANIAL PATHOLOGY. CT CERVICAL SPINE WO CONTRAST   Final Result   No acute cervical spine abnormality.             XR CHEST PORTABLE    (Results Pending)         ED BEDSIDE ULTRASOUND:   Performed by ED Physician - none    LABS:  Labs Reviewed   COMPREHENSIVE METABOLIC PANEL - Abnormal; Notable for the following components:       Result Value    Potassium 5.3 (*)     Glucose 236 (*)     BUN 34 (*)     CREATININE 1.99 (*)     GFR Non- 32.7 (*)     GFR  39.5 (*)     Calcium 8.4 (*)     Total Bilirubin 0.9 (*)     Alkaline Phosphatase 165 (*)     All other components within normal limits   CBC WITH AUTO DIFFERENTIAL - Abnormal; Notable for the following components:    RBC 3.59 (*)     Hemoglobin 10.2 (*)     Hematocrit 31.8 (*)     MCHC 32.1 (*)     RDW 17.2 (*)     Lymphocytes # 0.7 (*)     All other components within normal limits   CK - Abnormal; Notable for the following components: Total  (*)     All other components within normal limits   PROTIME-INR - Abnormal; Notable for the following components:    Protime 12.3 (*)     All other components within normal limits   POCT GLUCOSE - Normal   ETHANOL   TROPONIN   APTT   CKMB & RELATIVE PERCENT   MAGNESIUM   URINE DRUG SCREEN   URINE RT REFLEX TO CULTURE       All other labs were within normal range or not returned as of this dictation. EMERGENCY DEPARTMENT COURSE and DIFFERENTIAL DIAGNOSIS/MDM:   Vitals:    Vitals:    06/15/19 1500 06/15/19 1700 06/15/19 1730 06/15/19 1800   BP: 123/76 124/71 123/71 122/78   Pulse: 60 60 60 60   Resp: 16 20 16 20   Temp:       TempSrc:       SpO2: 95% 100% 98% 100%   Weight:       Height:              MDM  Number of Diagnoses or Management Options  MONIQUE (acute kidney injury) [facility]):   Dehydration:   Syncope and collapse:   Ventricular tachycardia [facility]):   Diagnosis management comments: Did speak with the wife on her arrival she states that the patient was mowing the lawn she states she did not witness the event but it was witnessed by a neighbor. Dates that he fell off the lawnmower and had a period where he was briefly unresponsive. Dates that he had a similar episode approximately 1 month ago while he was at home she states he had a period of unresponsive while in the shower he was not seen or evaluated after that event. Does have past cardiac history Dr. Glendy Eng is his cardiologist, negation of pacemaker was completed during patient's visit here according to the mell Alan at [facility] she states patient had 2 episodes of V. tach, terminated by shock. During Patient's visit here in the ER he has no recurrent episodes his primary complaint this time is just generalized fatigue.   Evaluation is negative his BUN and creatinine is elevated he does have a past history of kidney injury which

## 2019-06-15 NOTE — ED NOTES
Bed: 07  Expected date: 6/15/19  Expected time: 2:24 PM  Means of arrival: [facility]  Comments:  67 M - found on ground next to lawnmower. Pt does not remember what happened. C/o neck pain. C-collar.  144/91,109,94% RA     Aysha Rae, RN  06/15/19 [id number]

## 2019-06-15 NOTE — ED NOTES
Patient presents to the ER after being found by family on the lawn after mowing. Positive LOC. Nephew is the one who saw patient first.  When [facility] arrived patient was awake but lethargic. Patient on Plavix and 81 mg of ASA daily. Patient A&Ox4. Denies pain.       Elin Shoemaker RN  06/15/19 [id number]

## 2019-06-15 NOTE — PROGRESS NOTES
Patient seen and examined in the emergency room. Loss of consciousness, syncope while mowing his lawn. Syncope lasted for approximately 15 to 20 seconds as per a witness, neighbor. Patient does not remember the episode. Given the acute presentation, lack of recollection, and immediate regaining of consciousness without confusion; history is concerning for a cardiac arrhythmia. Stat pacer interrogation performed in the emergency room, ventricular tachycardia. Admit to cardiac unit, monitor on telemetry, amiodarone, cardiology consult.

## 2019-06-16 NOTE — PROGRESS NOTES
Elevated troponin noted, repeat troponin to assess trend    Back pain: After recent fall. PRN Tylenol, muscle relaxant.     Advance diet

## 2019-06-16 NOTE — CONSULTS
Consults                                                                         [facility] CARDIOLOGY CONSULTATION NOTE    PATIENT NAME: Yessy Buckley  PATIENT ICD shock: [id number]  SERVICE DATE:  6/16/2019  SERVICE TIME: 12:01 PM    PRIMARY CARE PHYSICIAN: Chriss Robison MD  CONSULTING CARDIOLOGIST : Clarita Kay MD [facility] [place]  PRIMARY CARDIOLOGIST: Felicita Drummond  ================================================================    REASON FOR CONSULTATION:      Sustained VT/ICD discharge. HPI:   Yessy Buckley is a 67 y.o. male who presented per ER notes of 6/15/19 for complaint of syncope while mowing his lawn. According to EMS family states patient was mowing the lawn he passed out and fell onto the ground they state he was unresponsive for a very brief period of time. Patient does not recall the specific incident he states he did not have chest pain shortness of breath or dizziness prior to the event he states he remembers waking up on the ground, with complaint of neck pain. Is no numbness or tingling no bowel or bladder dysfunction. States he had recently been admitted to the hospital for GI bleeding, without definable cause. Patient at this time denies any chest pain no shortness of breath no nausea vomiting dizziness no numbness or tingling no weakness he states he just feels generally fatigued at this point. States been eating and drinking as normal denies any alcohol consumption recently. He rates his overall pain at this time is a 5 out of 10 to the lateral aspects of the neck    Patient interviewed and examined, wife at the bedside. Prior records reviewed extensively particularly [facility]. Patient denies any chest discomfort pressure tightness heaviness or palpitations. Has chronic orthopnea. Baseline weight apparently is around 190 pounds. No history of medication noncompliance.   Per patient, at previous visit with Dr. Yvonne Kirkland 5/14/19, amiodarone was increased from 100 mg daily to 200 mg twice a day for 2 weeks, and after that 200mg daily. Has chronic shortness of breath functional class II/III. No recent CAD evaluation,    Has intolerance to ACE inhibitors and Ranexa. Has chronic kidney disease with a GFR in the low to mid 30s. Has prolonged AV interval , device check shows predominant 8 paced rhythm, A2 V2 32 ms  ms QTc 480 ms with lateral ST-T abnormality-2019. TSH 11.74 919 GFR 32 hemoglobin A1c 8 platelet count 73 hemoglobin 11.9. CARDIAC HISTORY:     Per last OV by Yahaira Saunders NP/EP/[facility] . Labs and testin-lead EKG reveals atrial pacing and ventricular tracking at 71 bpm. There is a first degree AV block, the AK interval is 232 ms. The QRS duration is 132 ms,  ms,  ms. ICD interrogation dated 2018 reveals atrial pacing 78.87% and ventricular pacing 7.09%. 3 episodes of nonsustained VT were noted lasting up to 14 beats in duration with ventricular rates up to 194 bpm. The most recent about was on 2018. CLINICAL IMPRESSIONS:   1. Ischemic cardiomyopathy with a left ventricular ejection fraction  of 30% per 2D echo dated 2016, New [place] Heart Association  Class 2 stage C heart failure. 2. Coronary artery disease, status post coronary artery bypass graft  surgery on 1992, with multivessel angioplasty and stent  deployment and most recent heart catheterization dated 2013, recommending medical management. 3. Dual-chamber ICD implant on 2010, with generator  change out on May 14, 2016, for URIEL parameters ([employer] ). 4. Carotid vascular disease, status post percutaneous transluminal  angioplasty with stent deployment in the right common carotid on May  18, 2015.  5. Valvular heart disease consisting of mild mitral annular  calcification, trivial MR, 1+ TR per 2D echo dated 2016.   6. Pulmonary hypertension of 43 mmHg per 2D echo dated October 24, 2016. 7. Hypertension, controlled with a blood pressure today of 128/72. 8. Dyslipidemia. 9. Diabetes mellitus type 2.  10. Hypothyroidism, on replacement therapy. 11. Degenerative joint disease. 12. Nonsustained ventricular tachycardia on amiodarone and beta blockade. Recommendations:  1. I discussed with the patient undergone pulmonary function testing. He reports that he had to cancel the last test. He states that he has difficulty tolerating that procedure. And move pulmonary function testing will obtain a PA and lateral chest x-ray as soon as possible. 2. We discussed obtaining lab work for evaluation while on the high risk medication. The patient states that he just had numerous lab tests done through the [employer] [facility] [place] clinic. I did request that the patient complete a release form so that N. [id number] [facility] May review those labs as related to high risk medication. 3. Obtain ICD checks per the [address] [place] [address] device clinic. 4. Follow-up in office with Dr. Landon Reyna in 6 months or sooner if needed. 5. Follow-up in office with Dr. Sosa Escalante on November 14, 2018 at 12:45 PM as scheduled or sooner if needed. 6. A letter to the [facility] regarding the medical necessity of amiodarone was completed. Last seen 5/14/19 by Yinka Kirk for ICD shock: Her assessment     Secondary prevention ICD as pt had recent appropriate ICD shock for VT. Syncope with VT.  s/p ICD 2010 with gen change 2016  [employer] John PATRICK [id number]. Follow in device clinic. Alternate remote check with device clinic and EP OV  Amiodarone for supression of VT and A fib. AF burden less than 0.1%. Recent sustained VT episdoe requiring shock. High risk med amiodarone. Hypothyroidism. Abnormal TSH. TSH 11.7. Recommended followup with endocrinology. Ischemic cardiomyopathy with LVEF 18% in 2013 then  improved to 30% echo 2016  New York Heart Association Class 2 stage C heart failure. On opt HF meds as bp and Cr tolerate.  Follows with HF clinic and cardiology. Will increase Lasix x 2 days given marked increase Optivol and REYES  Coronary artery disease, s/p CABG 1992 and multivessela PCI and stent with cath 2013 for med Rx  Carotid vascular disease, s/p angioplasty and stent R common carotid in 2015  PAD, s/p left leg stent then surgery and toe amputation 4/19  Valv heart disease with  trivial MR, 1+ TR echo 2016. Pulmonary hypertension with RVSP 43 mmHg  Hypertension, controlled, benign, chronic  CKD. GFR 31. FOllows with renal.  HYperlipidemia. on statin. DM II. Degenerative joint disease.       PAST MEDICAL HISTORY:    Past Medical History:   Diagnosis Date    Hyperlipidemia     Hypertension     Hypothyroidism     PVD (peripheral vascular disease) ([facility])     Type II or unspecified type diabetes mellitus without mention of complication, not stated as uncontrolled        PAST SURGICAL HISTORY:  Past Surgical History:   Procedure Laterality Date    CHOLECYSTECTOMY      COLONOSCOPY  3/21/14    Ayesha Aus    CORONARY ANGIOPLASTY WITH STENT PLACEMENT      CORONARY ARTERY BYPASS GRAFT      UPPER GASTROINTESTINAL ENDOSCOPY  3/20/14    RHIANNA       FAMILY HISTORY:    Family History   Problem Relation Age of Onset    Heart Attack Mother     Stroke Father     Heart Attack Brother     Heart Disease Brother        SOCIAL HISTORY:    Social History     Socioeconomic History    Marital status:      Spouse name: Not on file    Number of children: Not on file    Years of education: Not on file    Highest education level: Not on file   Occupational History    Not on file   Social Needs    Financial resource strain: Not on file    Food insecurity:     Worry: Not on file     Inability: Not on file    Transportation needs:     Medical: Not on file     Non-medical: Not on file   Tobacco Use    Smoking status: Never Smoker   Substance and Sexual Activity    Alcohol use: No    Drug use: Not on file    Sexual activity: Not on file Lifestyle    Physical activity:     Days per week: Not on file     Minutes per session: Not on file    Stress: Not on file   Relationships    Social connections:     Talks on phone: Not on file     Gets together: Not on file     Attends Mandaen service: Not on file     Active member of club or organization: Not on file     Attends meetings of clubs or organizations: Not on file     Relationship status: Not on file    Intimate partner violence:     Fear of current or ex partner: Not on file     Emotionally abused: Not on file     Physically abused: Not on file     Forced sexual activity: Not on file   Other Topics Concern    Not on file   Social History Narrative    Not on file       MEDICATIONS:  Current Facility-Administered Medications   Medication Dose Route Frequency Provider Last Rate Last Dose    acetaminophen (TYLENOL) tablet 650 mg  650 mg Oral Q6H PRN Analilia Castro MD        0.9 % sodium chloride infusion   Intravenous Continuous Cary Donovan PA-C 125 mL/hr at 06/16/19 0645 125 mL/hr at 06/16/19 0645    amiodarone (CORDARONE) 450 mg in dextrose 5 % 250 mL infusion  1 mg/min Intravenous Continuous Cary Donovan PA-C 33.3 mL/hr at 06/16/19 0130 1 mg/min at 06/16/19 0130    sodium chloride flush 0.9 % injection 10 mL  10 mL Intravenous 2 times per day Rubin Damon PA-C        sodium chloride flush 0.9 % injection 10 mL  10 mL Intravenous PRN Rubin Damon PA-C        magnesium hydroxide (MILK OF MAGNESIA) 400 MG/5ML suspension 30 mL  30 mL Oral Daily PRN Rubin Damon PA-C        ondansetron (ZOFRAN) injection 4 mg  4 mg Intravenous Q6H PRN Rubin Damon PA-C        aspirin chewable tablet 81 mg  81 mg Oral Daily Kiana Nicole PA-C        enoxaparin (LOVENOX) injection 40 mg  40 mg Subcutaneous Daily Rubin Damon PA-C   40 mg at 06/15/19 2243    nitroGLYCERIN (NITROSTAT) SL tablet 0.4 mg  0.4 mg Sublingual Q5 Min PRN Rubin Damon PA-C        insulin lispro (HUMALOG) injection vial 0-12 Units  0-12 Units Subcutaneous TID WC Rickey Haywood PA-C   4 Units at 06/16/19 1150    insulin lispro (HUMALOG) injection vial 0-6 Units  0-6 Units Subcutaneous Nightly Kiana Nicole PA-C        glucose (GLUTOSE) 40 % oral gel 15 g  15 g Oral PRN Rickey Haywood PA-C        dextrose 50 % IV solution  12.5 g Intravenous PRN [facility] KANIKA Haywood        glucagon (rDNA) injection 1 mg  1 mg Intramuscular PRN [facility] KANIKA Haywood        dextrose 5 % solution  100 mL/hr Intravenous PRN Kiana Nicole PA-C        aspirin EC tablet 81 mg  81 mg Oral Daily [facility] snell   81 mg at 06/16/19 8984    clopidogrel (PLAVIX) tablet 75 mg  75 mg Oral Daily [facility] KANIKA Haywood   75 mg at 06/16/19 3367    docusate sodium (COLACE) capsule 100 mg  100 mg Oral BID Mallissa YobanyBAM snellC   100 mg at 06/16/19 8650    ferrous sulfate tablet 325 mg  325 mg Oral Daily with breakfast Rickey Haywood PA-C   325 mg at 06/16/19 5086    gabapentin (NEURONTIN) capsule 300 mg  300 mg Oral TID Mallissa YobanyBAM snell   300 mg at 06/16/19 7852    levothyroxine (SYNTHROID) tablet 112 mcg  112 mcg Oral Daily [facility]   112 mcg at 06/16/19 1559    pantoprazole (PROTONIX) tablet 20 mg  20 mg Oral Daily [facility]   20 mg at 06/16/19 7583    vitamin B-12 (CYANOCOBALAMIN) tablet 500 mcg  500 mcg Oral Daily [facility]ell[facility]   500 mcg at 06/16/19 4627    magnesium oxide (MAG-OX) tablet 400 mg  400 mg Oral Daily [facility] snell[facility]   400 mg at 06/16/19 7750    simvastatin (ZOCOR) tablet 20 mg  20 mg Oral Nightly [facility] Haywood-C   20 mg at 06/15/19 2242    carvedilol (COREG) tablet 25 mg  25 mg Oral BID Arpan Light DO   25 mg at 06/16/19 2391    insulin glargine (LANTUS) injection vial 16 Units  16 Units Subcutaneous Nightly Arpan Light DO        isosorbide mononitrate (IMDUR) extended release tablet 60 mg  60 mg Oral Daily Brett Light DO   60 mg at 06/16/19 1150         ALLERGIES AND DRUG INTOLERANCES:   Allergies   Allergen Reactions    Ranolazine Anaphylaxis    Altace [Ramipril]     Lipitor [Atorvastatin]     Lisinopril Hives    Penicillins          Review of Systems     Review of Systems   Constitutional: Positive for fatigue. Negative for activity change, appetite change and fever. HENT: Negative for congestion, ear discharge, ear pain, nosebleeds, rhinorrhea, sore throat, tinnitus, trouble swallowing and voice change. Eyes: Negative for discharge. Respiratory: Negative for cough, shortness of breath and stridor. Cardiovascular: Negative for chest pain, palpitations and leg swelling. Gastrointestinal: Negative for abdominal distention, abdominal pain and constipation. Genitourinary: Negative for difficulty urinating, dysuria and urgency. Musculoskeletal: Positive for neck pain. Negative for arthralgias, back pain, gait problem, joint swelling and myalgias. Skin: Negative for color change, pallor, rash and wound. Neurological: Positive for syncope. Negative for dizziness, tremors, weakness, numbness and headaches. Psychiatric/Behavioral: Negative for agitation and confusion.         Except as noted above the remainder of the review of systems was reviewed and negative. BP (!) 106/59   Pulse 59   Temp 97.9 °F (36.6 °C) (Oral)   Resp 17   Ht 5' 11\" (1.803 m)   Wt 198 lb (89.8 kg)   SpO2 99%   BMI 27.62 kg/m²   Physical Exam:    Constitutional: He is oriented to person, place, and time. He appears well-developed and well-nourished. HENT:   Head: Normocephalic. Minor abrasion noted to ear right side no active bleeding small skin tear. Eyes: No icterus. Neck: Normal range of motion. No JVD present. No tracheal deviation present. Cardiovascular:  Regular rate, grade 1/6 to 2/6 holosystolic murmur over area of PMI, somewhat high-pitched, consistent with mitral regurgitation murmur.   Cannot exclude soft S3 gallop. .   Pulmonary/Chest:  Increased respiratory effort, speaks in short sentences, minimal accessory muscle use. Reports that the shortness of breath was worse after eating. Distant  breath sounds without crepitations or rhonchi or  audible wheezing. Healed midline incision of previous coronary artery bypass grafting. Abdominal: Soft, distended. . Bowel sounds are present. .  No rebound or guarding. No hepatosplenomegaly. . There is no tenderness. There is no guarding. No epigastric or renal arterial bruit appreciated. Musculoskeletal:  Ribs all extremities. Left foot has a bandage, status post recent amputation of second toe on the left foot, dark dry eschar  outer aspect of left left little toe. Neurological: He is alert and oriented to person, place, and time. No gross cranial nerve deficits moves all extremities. Skin: Skin is warm. There is pallor. Pulses are markedly diminished. I was able to Doppler dorsalis pedis and posterior tibial in both feet. Both femoral pulses are also diminished, probably 1 to 2+ out of 4 plus.     Left subclavian ICD pocket is well healed without redness, swelling, or drainage      EKG:  EKG: Atrial paced, HV interval to 70 ms, QRS duration 132 ms,  ms. And lateral ST abnormalities unchanged from previous EKGs. ICD interrogation 6/15/2019-reports 2 episodes of ventricular dysrhythmia, both unable to be paced terminated, and terminated with shocks. No atrial fibrillation identified as 5/14/2019. Imaging results:    Ct Head Wo Contrast    Result Date: 6/15/2019  EXAMINATION: CT HEAD WO CONTRAST  DATE AND TIME:6/15/2019 2:45 PM CLINICAL HISTORY: Severe headache.  syncope, fall  COMPARISON: April 28, 2016 TECHNIQUE:Axial CT from skull base to vertex without  contrast..  All CT scans at this facility use dose modulation, iterative reconstruction, and/or weight based dosing when appropriate to reduce radiation dose to as low as reasonably achievable. FINDINGS CSF spaces: There is diffuse prominence of the CSF spaces of the ventricles and cerebral convexities consistent with significant volume loss. However there can be significant overlap in the appearance of age-related changes and neurodegenerative disease. Brain parenchyma: There is no parenchymal mass, or mass effect signs of acute infarct, acute hemorrhage or extra-axial fluid. There are bilateral scattered periventricular and juxtacortical hypodensities that may be related to microangiopathy, however there can be significant overlap between normal age-related changes and subcortical arteriosclerotic disease. Skull base: The bony calvarium and skull base are normal.  Chronic right maxillary sinusitis     NO ACUTE INTRACRANIAL PATHOLOGY. Ct Cervical Spine Wo Contrast    Result Date: 6/15/2019  EXAMINATION: CT CERVICAL SPINE WO CONTRAST   DATE AND TIME:6/15/2019 2:45 PM CLINICAL HISTORY:Acute posterior neck pain  syncope and fall  COMPARISON: None TECHNIQUE:Helical scanning was performed from the skull base through the remainder of the cervical spine without intravenous contrast. Sagittal and coronal reformats were obtained. The lack of contrast limits CT sensitivity of the soft tissues. All CT scans at this facility use dose modulation, iterative reconstruction, and/or weight based dosing when appropriate to reduce radiation dose to as low as reasonably achievable. FINDINGS      Alignment:  The cervical column shows normal cervical lordosis. . There is slight tilting of the cervical spine toward the right. There is no subluxation. Vertebrae: There is severe cervical spondylosis with multiple level disc osteophyte changes. No acute fracture. Soft tissues: The paraspinal soft tissues demonstrate a patent airway with no evidence of stenosis or mass. Larynx is normal with no evidence for vocal cord dysfunction.  The lymph nodes, prevertebral and retrovertebral soft tissues are within normal limits. Chronic right maxillary sinusitis Lung apices: The visualized soft tissues and lung fields show no significant finding. No acute cervical spine abnormality. 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 Equivocal changes at the right base. I doubt but cannot exclude a small infiltrate or atelectasis. Lungs otherwise clear. No overt pulmonary edema. QUESTIONABLE INFILTRATE/ATELECTASIS RIGHT BASE.        LABS:  Recent Labs     06/15/19  1507 06/16/19  0633   WBC 5.9 6.5   HCT 31.8* 29.1*    122*   INR 1.2  --     139   K 5.3* 4.4   CO2 26 25   BUN 34* 34*   MG 2.1  --      CBC:   Recent Labs     06/15/19  1507 06/16/19  0633   WBC 5.9 6.5   HGB 10.2* 9.3*   HCT 31.8* 29.1*    122*     BMP:  Recent Labs     06/15/19  1507 06/16/19  0633    139   K 5.3* 4.4    103   CO2 26 25   BUN 34* 34*   CREATININE 1.99* 1.88*   LABGLOM 32.7* 34.9*     ABGs: No results found for: PH, PO2, PCO2  INR:   Recent Labs     06/15/19  1507   INR 1.2     PRO-BNP:   Lab Results   Component Value Date    PROBNP 7,975 06/16/2019    PROBNP 919 07/10/2016      TSH:   Lab Results   Component Value Date    TSH 1.730 07/10/2016      Cardiac Injury Profile:   Recent Labs     06/15/19  1507 06/15/19  1949 06/16/19  0633   CKTOTAL 206*  --   --    CKMB 6.1  --   --    TROPONINI <0.010 <0.010 0.020*      Lipid Profile:   Lab Results   Component Value Date    TRIG 51 06/16/2019    HDL 29 06/16/2019    LDLCALC 27 06/16/2019    CHOL 66 06/16/2019      Hemoglobin A1C: No components found for: HGBA1C       Intake/Output Summary (Last 24 hours) at 6/16/2019 1201  Last data filed at 6/16/2019 0859  Gross per 24 hour   Intake 1830 ml   Output 700 ml   Net 1130 ml amiodarone. 5. Obtain most recent discharge summary from Jennifer Joya Dr, regarding percutaneous intervention. 6. Decrease amiodarone to 0.5mcg per kilo per minute. 7. EP consultation will be requested. 8. If tertiary care center transfer is warranted, patient and family prefer [facility] because they have been already established with [facility]. They are however perfectly okay with transfer to [address] that will suffice. 9. Dr. Elysia Woodson, primary cardiologist to assume care starting 6/17/2019. Discussed plan of care with Dr. Iván Hansen ,hospitalist who is in agreement. 10. Dennise Dorman is not being considered because of documented intolerance to ACE inhibitors, and also because of chronic kidney injury and the need for contrast exposure and diuresis.      ================================================================      Thank you for your consideration for this consultation.     SIGNATURE: Electronically signed by Tea Magana MD on 6/16/2019 at 12:01 PM

## 2019-06-16 NOTE — PROGRESS NOTES
Hospitalist Progress Note      Date of Admission: 6/15/2019  Chief Complaint:    Chief Complaint   Patient presents with    Fall     fell from       Subjective:  No new complaints.   No nausea, vomiting, chest pain, or headache      Medications:    Infusion Medications    furosemide (LASIX) 1mg/ml infusion 5 mg/hr (06/16/19 1511)    amiodarone 450mg/250ml D5W infusion 0.5 mg/min (06/16/19 1338)    dextrose       Scheduled Medications    sodium chloride flush  10 mL Intravenous 2 times per day    enoxaparin  40 mg Subcutaneous Daily    insulin lispro  0-12 Units Subcutaneous TID WC    insulin lispro  0-6 Units Subcutaneous Nightly    clopidogrel  75 mg Oral Daily    docusate sodium  100 mg Oral BID    ferrous sulfate  325 mg Oral Daily with breakfast    gabapentin  300 mg Oral TID    levothyroxine  112 mcg Oral Daily    pantoprazole  20 mg Oral Daily    vitamin B-12  500 mcg Oral Daily    magnesium oxide  400 mg Oral Daily    simvastatin  20 mg Oral Nightly    carvedilol  25 mg Oral BID    insulin glargine  16 Units Subcutaneous Nightly    isosorbide mononitrate  60 mg Oral Daily     PRN Meds: acetaminophen, sodium chloride flush, magnesium hydroxide, ondansetron, nitroGLYCERIN, glucose, dextrose, glucagon (rDNA), dextrose    Intake/Output Summary (Last 24 hours) at 6/16/2019 1916  Last data filed at 6/16/2019 1823  Gross per 24 hour   Intake 2230 ml   Output 700 ml   Net 1530 ml     Exam:  /63   Pulse 59   Temp 97.5 °F (36.4 °C) (Oral)   Resp 17   Ht 5' 11\" (1.803 m)   Wt 198 lb (89.8 kg)   SpO2 100%   BMI 27.62 kg/m²   Head: Normocephalic, atraumatic  Sclera clear  Neck supple, nontender  Lungs: clear    Labs:   Recent Labs     06/15/19  1507 06/16/19  0633   WBC 5.9 6.5   HGB 10.2* 9.3*   HCT 31.8* 29.1*    122*     Recent Labs     06/15/19  1507 06/16/19  0633    139   K 5.3* 4.4    103   CO2 26 25   BUN 34* 34*   CREATININE 1.99* 1.88*   CALCIUM 8.4* 8.4*   AST 19 19   ALT 20 18   BILITOT 0.9* 1.0*   ALKPHOS 165* 139*     Recent Labs     06/15/19  1507   INR 1.2     Recent Labs     06/15/19  1507 06/15/19  1949 06/16/19  0633 06/16/19  1340   CKTOTAL 206*  --   --   --    TROPONINI <0.010 <0.010 0.020* 0.019*     Radiology:  XR CHEST PORTABLE   Final Result   QUESTIONABLE INFILTRATE/ATELECTASIS RIGHT BASE. CT Head WO Contrast   Final Result   NO ACUTE INTRACRANIAL PATHOLOGY. CT CERVICAL SPINE WO CONTRAST   Final Result   No acute cervical spine abnormality. Assessment/Plan:    AICD discharge: Currently on antiarrhythmics, reassess coronary anatomy in order to confirm that there is no underlying ischemic etiology for current arrhythmias.     35 minutes total care time, >1/2 in unit/floor time and care coordination     Electronically signed by Rahul Alvarez MD on 6/16/2019 at 7:16 PM

## 2019-06-17 NOTE — PROGRESS NOTES
Hx,allergies and medications reviewed. One [facility] here. Injected patient with Louanna Frizzle and Myoview. Tolerated procedure well. SOB reported. Returned to baseline in recovery. Denied chest pain or pressure. EKG shows pacing with ectopy      Patient urinated 450 cc clear urine. Pronota

## 2019-06-17 NOTE — CONSULTS
mouth daily Indications: patient taking 60 mg daily now.  levothyroxine (SYNTHROID) 112 MCG tablet Take 1 tab daily      gabapentin (NEURONTIN) 300 MG capsule Take 600 mg by mouth 3 times daily. Take one(1) tablet three times daily.  simvastatin (ZOCOR) 20 MG tablet Take by mouth      nitroGLYCERIN (NITROSTAT) 0.4 MG SL tablet Place 0.4 mg under the tongue      Magnesium Oxide 400 MG CAPS Take 400 mg by mouth      vitamin B-12 (CYANOCOBALAMIN) 500 MCG tablet Take one(1) tablet daily.  VITAMIN D-3 (COLECALCIFEROL) 400 UNITS TABS Take by mouth      amiodarone (CORDARONE) 200 MG tablet Take 200 mg by mouth daily Indications: patient taking 100mg daily now.  aspirin 81 MG tablet Take 81 mg by mouth daily      clopidogrel (PLAVIX) 75 MG tablet Take 75 mg by mouth daily      docusate sodium (COLACE) 100 MG capsule Take 100 mg by mouth 2 times daily      ferrous sulfate 325 (65 FE) MG tablet Take 325 mg by mouth daily (with breakfast)      furosemide (LASIX) 20 MG tablet Take 20 mg by mouth 2 times daily Indications: patient taking 10mg daily.  sucralfate (CARAFATE) 1 GM tablet Take 1 g by mouth 4 times daily      Ascorbic Acid (VITAMIN C) 500 MG CAPS Take by mouth      pantoprazole (PROTONIX) 20 MG tablet Take 20 mg by mouth daily      pantoprazole (PROTONIX) 40 MG tablet Take 40 mg by mouth      Coenzyme Q10 (CO Q 10 PO) Take by mouth      KRILL OIL PO Take by mouth         Allergies:  Ranolazine; Altace [ramipril]; Lipitor [atorvastatin];  Lisinopril; and Penicillins    Social History:    Social History     Socioeconomic History    Marital status:      Spouse name: Not on file    Number of children: Not on file    Years of education: Not on file    Highest education level: Not on file   Occupational History    Not on file   Social Needs    Financial resource strain: Not on file    Food insecurity:     Worry: Not on file     Inability: Not on file   [employer] needs: Medical: Not on file     Non-medical: Not on file   Tobacco Use    Smoking status: Never Smoker   Substance and Sexual Activity    Alcohol use: No    Drug use: Not on file    Sexual activity: Not on file   Lifestyle    Physical activity:     Days per week: Not on file     Minutes per session: Not on file    Stress: Not on file   Relationships    Social connections:     Talks on phone: Not on file     Gets together: Not on file     Attends Judaism service: Not on file     Active member of club or organization: Not on file     Attends meetings of clubs or organizations: Not on file     Relationship status: Not on file    Intimate partner violence:     Fear of current or ex partner: Not on file     Emotionally abused: Not on file     Physically abused: Not on file     Forced sexual activity: Not on file   Other Topics Concern    Not on file   Social History Narrative    Not on file       Family History:   Family History   Problem Relation Age of Onset    Heart Attack Mother     Stroke Father     Heart Attack Brother     Heart Disease Brother        Review of Systems:   Review of Systems   Constitutional: Negative for activity change. HENT: Negative for congestion. Eyes: Negative for discharge. Respiratory: Negative for chest tightness. Cardiovascular: Negative for chest pain. Gastrointestinal: Negative for abdominal distention. Endocrine: Negative for cold intolerance. Genitourinary: Negative for difficulty urinating. Musculoskeletal: Negative for arthralgias. Allergic/Immunologic: Negative for environmental allergies. Neurological: Positive for syncope. Hematological: Negative for adenopathy. Psychiatric/Behavioral: Negative for agitation.          Physical exam:   Constitutional:  VITALS:  /61   Pulse 61   Temp 97.9 °F (36.6 °C) (Oral)   Resp 18   Ht 5' 11\" (1.803 m)   Wt 198 lb (89.8 kg)   SpO2 96%   BMI 27.62 kg/m²   Gen: alert, awake, nad  Skin: no rash, turgor wnl  Heent:  eomi, mmm  Neck: no bruits or jvd noted, thyroid normal  Lungs:  Normal expansion. Clear to auscultation. No rales, rhonchi, or wheezing. Heart:  Heart sounds are normal.  Regular rate and rhythm without murmur, gallop or rub. Abdomen:  +bs, soft, nt, nd, no hepatosplenomegaly   Extremities: Extremities warm to touch, pink, with no edema. Psychiatric: mood and affect appropriate; judgement and insight intact  Musculoskeletal:  Rom, muscular strength intact; digits, nails normal    Data/  CBC:   Lab Results   Component Value Date    WBC 6.5 06/16/2019    RBC 3.31 06/16/2019    RBC 4.11 04/17/2012    HGB 9.3 06/16/2019    HCT 29.1 06/16/2019    MCV 87.7 06/16/2019    MCH 28.2 06/16/2019    MCHC 32.1 06/16/2019    RDW 17.2 06/16/2019     06/16/2019    MPV 8.7 04/19/2015     BMP:    Lab Results   Component Value Date     06/17/2019    K 4.2 06/17/2019    K 4.4 06/16/2019     06/17/2019    CO2 25 06/17/2019    BUN 35 06/17/2019    LABALBU 3.3 06/16/2019    LABALBU 3.9 11/08/2011    CREATININE 2.08 06/17/2019    CALCIUM 8.6 06/17/2019    GFRAA 37.5 06/17/2019    LABGLOM 31.0 06/17/2019    GLUCOSE 158 06/17/2019    GLUCOSE 124 04/17/2012     Ct Head Wo Contrast    Result Date: 6/15/2019  EXAMINATION: CT HEAD WO CONTRAST  DATE AND TIME:6/15/2019 2:45 PM CLINICAL HISTORY: Severe headache.  syncope, fall  COMPARISON: April 28, 2016 TECHNIQUE:Axial CT from skull base to vertex without  contrast..  All CT scans at this facility use dose modulation, iterative reconstruction, and/or weight based dosing when appropriate to reduce radiation dose to as low as reasonably achievable. FINDINGS CSF spaces: There is diffuse prominence of the CSF spaces of the ventricles and cerebral convexities consistent with significant volume loss. However there can be significant overlap in the appearance of age-related changes and neurodegenerative disease.                         Brain parenchyma: There is no parenchymal mass, or mass effect signs of acute infarct, acute hemorrhage or extra-axial fluid. There are bilateral scattered periventricular and juxtacortical hypodensities that may be related to microangiopathy, however there can be significant overlap between normal age-related changes and subcortical arteriosclerotic disease. Skull base: The bony calvarium and skull base are normal.  Chronic right maxillary sinusitis     NO ACUTE INTRACRANIAL PATHOLOGY. Ct Cervical Spine Wo Contrast    Result Date: 6/15/2019  EXAMINATION: CT CERVICAL SPINE WO CONTRAST   DATE AND TIME:6/15/2019 2:45 PM CLINICAL HISTORY:Acute posterior neck pain  syncope and fall  COMPARISON: None TECHNIQUE:Helical scanning was performed from the skull base through the remainder of the cervical spine without intravenous contrast. Sagittal and coronal reformats were obtained. The lack of contrast limits CT sensitivity of the soft tissues. All CT scans at this facility use dose modulation, iterative reconstruction, and/or weight based dosing when appropriate to reduce radiation dose to as low as reasonably achievable. FINDINGS      Alignment:  The cervical column shows normal cervical lordosis. . There is slight tilting of the cervical spine toward the right. There is no subluxation. Vertebrae: There is severe cervical spondylosis with multiple level disc osteophyte changes. No acute fracture. Soft tissues: The paraspinal soft tissues demonstrate a patent airway with no evidence of stenosis or mass. Larynx is normal with no evidence for vocal cord dysfunction. The lymph nodes, prevertebral and retrovertebral soft tissues are within normal limits. Chronic right maxillary sinusitis Lung apices: The visualized soft tissues and lung fields show no significant finding. No acute cervical spine abnormality.        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 Equivocal changes at the right base. I doubt but cannot exclude a small infiltrate or atelectasis. Lungs otherwise clear. No overt pulmonary edema. QUESTIONABLE INFILTRATE/ATELECTASIS RIGHT BASE. Assessment/  80-year-old man with CKD stage III-IV. GFR is 31 cc/min which is about his baseline. Risk factors of diabetes mellitus, hypertension, coronary artery disease with bypass surgery, carotid stent, congestive heart failure with EF of 30%. This imaging was negative for any obstruction. Urinalysis is negative for protein and blood. Has outpatient follow-up with [facility] nephrology. Has anemia. Has fluid overload. Plan/  1- cont lasix drip at 5mg/hr as still has edema and responding well  2- check iron levels. Consider umer  3- gfr stable as of now    Thank you for the consultation. Please do not hesitate to call with questions.     Samantha Wilkinson

## 2019-06-17 NOTE — PROCEDURES
[address]                      [address] [facility], [address] [facility]                              CARDIAC STRESS TEST    PATIENT NAME: Corin Terrazas                    :        1940  MED REC NO:   [id number]                            ROOM:       R115  ACCOUNT NO:   [de-identified]                           ADMIT DATE: 06/15/2019  PROVIDER:     Farooq Ramires MD    CARDIOVASCULAR DIAGNOSTIC DEPARTMENT    DATE OF STUDY:  2019    LEXISCAN MYOVIEW PERFUSION STRESS TEST    INDICATIONS:  Chest pain, elevated troponins and ventricular  tachycardia. TECHNIQUE RESULTS:  Standard Lexiscan Myoview cardiac perfusion stress  test protocol was followed. Rest and stress tomographic images were  obtained. Left ventricular ejection fraction and gated wall motion were  acquired. RESULTS:  Resting heart rate and blood pressure were 60 beats per minute  and 129/60 respectively. Resting electrocardiogram revealed sinus  rhythm, , nonspecific intraventricular conduction delay, low voltage,  and secondary ST-T wave changes. The patient had an adequate  vasodilatory response to [place]. There were no significant ischemic  EKG changes or dysrhythmias. There was a normal recovery phase. Rest and stress tomographic images were reviewed and revealed fixed  dilated left ventricle and right ventricle with left ventricular  moderate fixed anterior, anteroseptal, anterolateral, lateral, and  inferior moderate perfusion defect. There was no reversibility  appreciated. No obvious ischemia. Overall, left ventricular function  was severely globally hypokinetic with calculated ejection fraction of  30%. There was no transient ischemic dilatation. Image quality was  good. IMPRESSION:  1. Abnormal Lexiscan myocardial perfusion stress test.  2.  No evidence of myocardial ischemia by perfusion imaging.   3.  Dilated LV with moderate anteroseptal, anterior, anterolateral,  lateral, and inferior areas of moderate hypoperfusion. 4.  Global left ventricular dysfunction, less than 30%. 5.  Right ventricular enlargement. 6.  Underlying ventricular paced rhythm. COMMENTS:  Clinical correlation is advised.         Aletha Hedrick MD    D: 06/17/2019 #18:41:[id number]       T: 06/17/2019 19:24:49     [email or web address]  Job#: [id number]     Doc#: [id number]    CC:

## 2019-06-17 NOTE — PROGRESS NOTES
Hospitalist Progress Note      Date of Admission: 6/15/2019  Chief Complaint:    Chief Complaint   Patient presents with    Fall     fell from       Subjective:  No new complaints.   No nausea, vomiting, chest pain, or headache       Medications:    Infusion Medications    sodium chloride      furosemide (LASIX) 1mg/ml infusion 5 mg/hr (06/16/19 1511)    amiodarone 450mg/250ml D5W infusion 0.5 mg/min (06/16/19 2152)    dextrose       Scheduled Medications    sodium chloride flush  10 mL Intravenous 2 times per day    diazepam  5 mg Oral 30 Min Pre-Op    predniSONE  50 mg Oral Once    diphenhydrAMINE  50 mg Oral Once    sodium chloride flush  10 mL Intravenous 2 times per day    enoxaparin  40 mg Subcutaneous Daily    insulin lispro  0-12 Units Subcutaneous TID WC    insulin lispro  0-6 Units Subcutaneous Nightly    clopidogrel  75 mg Oral Daily    docusate sodium  100 mg Oral BID    ferrous sulfate  325 mg Oral Daily with breakfast    gabapentin  300 mg Oral TID    levothyroxine  112 mcg Oral Daily    pantoprazole  20 mg Oral Daily    vitamin B-12  500 mcg Oral Daily    magnesium oxide  400 mg Oral Daily    simvastatin  20 mg Oral Nightly    carvedilol  25 mg Oral BID    insulin glargine  16 Units Subcutaneous Nightly    isosorbide mononitrate  60 mg Oral Daily     PRN Meds: sodium chloride flush, technetium tetrofosmin, technetium tetrofosmin, sodium chloride flush, regadenoson, acetaminophen, melatonin, sodium chloride flush, magnesium hydroxide, ondansetron, nitroGLYCERIN, glucose, dextrose, glucagon (rDNA), dextrose    Intake/Output Summary (Last 24 hours) at 6/17/2019 1031  Last data filed at 6/17/2019 0658  Gross per 24 hour   Intake 1054 ml   Output 1150 ml   Net -96 ml     Exam:  /61   Pulse 61   Temp 97.9 °F (36.6 °C) (Oral)   Resp 18   Ht 5' 11\" (1.803 m)   Wt 198 lb (89.8 kg)   SpO2 96%   BMI 27.62 kg/m²   Head: Normocephalic, atraumatic  Sclera clear  Neck supple, nontender  Lungs: clear    Labs:   Recent Labs     06/15/19  1507 06/16/19  0633   WBC 5.9 6.5   HGB 10.2* 9.3*   HCT 31.8* 29.1*    122*     Recent Labs     06/15/19  1507 06/16/19  0633 06/17/19  0553    139 139   K 5.3* 4.4 4.2    103 102   CO2 26 25 25   BUN 34* 34* 35*   CREATININE 1.99* 1.88* 2.08*   CALCIUM 8.4* 8.4* 8.6   AST 19 19  --    ALT 20 18  --    BILITOT 0.9* 1.0*  --    ALKPHOS 165* 139*  --      Recent Labs     06/15/19  1507   INR 1.2     Recent Labs     06/15/19  1507 06/15/19  1949 06/16/19  0633 06/16/19  1340   CKTOTAL 206*  --   --   --    TROPONINI <0.010 <0.010 0.020* 0.019*     Radiology:  XR CHEST PORTABLE   Final Result   QUESTIONABLE INFILTRATE/ATELECTASIS RIGHT BASE. CT Head WO Contrast   Final Result   NO ACUTE INTRACRANIAL PATHOLOGY. CT CERVICAL SPINE WO CONTRAST   Final Result   No acute cervical spine abnormality. NM MYOCARDIAL SPECT REST EXERCISE OR RX    (Results Pending)     Assessment/Plan:    AICD discharge: Currently on antiarrhythmics, reassess coronary anatomy in order to confirm that there is no underlying ischemic etiology for current arrhythmias. Elevated TSH in the background of amiodarone: Endocrinology consulted    Chronic kidney disease, nephrology to assist in management given anticipated contrast exposure.     35 minutes total care time, >1/2 in unit/floor time and care coordination     Electronically signed by Marylen Button, MD on 6/17/2019 at 10:31 AM

## 2019-06-17 NOTE — PROGRESS NOTES
toe diabetic ulcer post amputation 2019  Otherwise as per assessment below. PLAN:    Cardiovascular supportive care. Continue amiodarone. Adjust medications as tolerated. Lexiscan perfusion stress test.  Change IV Lasix gtt and to IV twice daily dosing. Nephrology evaluation  Possible cardiac catheterization tomorrow if stable. Electrophysiology evaluation. Further recommendations to follow. See orders.       OBJECTIVE:     MEDICATIONS:     Scheduled Meds:   sodium chloride flush  10 mL Intravenous 2 times per day    diazepam  5 mg Oral 30 Min Pre-Op    predniSONE  50 mg Oral Once    diphenhydrAMINE  50 mg Oral Once    sodium chloride flush  10 mL Intravenous 2 times per day    enoxaparin  40 mg Subcutaneous Daily    insulin lispro  0-12 Units Subcutaneous TID WC    insulin lispro  0-6 Units Subcutaneous Nightly    clopidogrel  75 mg Oral Daily    docusate sodium  100 mg Oral BID    ferrous sulfate  325 mg Oral Daily with breakfast    gabapentin  300 mg Oral TID    levothyroxine  112 mcg Oral Daily    pantoprazole  20 mg Oral Daily    vitamin B-12  500 mcg Oral Daily    magnesium oxide  400 mg Oral Daily    simvastatin  20 mg Oral Nightly    carvedilol  25 mg Oral BID    insulin glargine  16 Units Subcutaneous Nightly    isosorbide mononitrate  60 mg Oral Daily     Continuous Infusions:   sodium chloride      furosemide (LASIX) 1mg/ml infusion 5 mg/hr (06/16/19 1511)    amiodarone 450mg/250ml D5W infusion 0.5 mg/min (06/16/19 2152)    dextrose       PRN Meds:sodium chloride flush, technetium tetrofosmin, technetium tetrofosmin, sodium chloride flush, regadenoson, acetaminophen, melatonin, sodium chloride flush, magnesium hydroxide, ondansetron, nitroGLYCERIN, glucose, dextrose, glucagon (rDNA), dextrose    PHYSICAL EXAM:    CURRENT VITALS: /61   Pulse 61   Temp 97.9 °F (36.6 °C) (Oral)   Resp 18   Ht 5' 11\" (1.803 m)   Wt 198 lb (89.8 kg)   SpO2 96%   BMI 27.62 kg/m² CONSTITUTIONAL:  awake, alert, cooperative, no apparent distress,   ENT:  Normocephalic, without obvious abnormality, atraumatic, sinuses nontender on palpation, external ears without lesions,  NECK:  Supple, symmetrical, trachea midline, no adenopathy, thyroid symmetric, not enlarged and no tenderness, skin normal  LUNGS:  No increased work of breathing, good air exchange, clear to auscultation bilaterally, no crackles or wheezing. CARDIOVASCULAR:  Normal apical impulse, regular rate and rhythm, normal S1 and S2,  and 2/6 murmur noted. AICD left Chest.  Mid incisional scars. ABDOMEN:  Obese, Normal bowel sounds, soft, non-distended, non-tender, no masses palpated, no hepatosplenomegally  EXTREMITIES:  1+ edema, Pulses strong throughout. NEUROLOGIC:  Awake, alert, oriented to name, place and time.  Following all commands and moving all extremties  SKIN:  no bruising or bleeding, normal skin color, texture, turgor and no rashes     Data:       LABS:  Recent Results (from the past 24 hour(s))   POCT Glucose    Collection Time: 06/16/19 10:59 AM   Result Value Ref Range    POC Glucose 209 (H) 60 - 115 mg/dl    Performed on ACCU-CHEK    Troponin    Collection Time: 06/16/19  1:40 PM   Result Value Ref Range    Troponin 0.019 (HH) 0.000 - 0.010 ng/mL   TSH with Reflex    Collection Time: 06/16/19  1:40 PM   Result Value Ref Range    TSH 17.130 (H) 0.440 - 3.860 uIU/mL   T4, Free    Collection Time: 06/16/19  1:40 PM   Result Value Ref Range    T4 Free 1.59 0.84 - 1.68 ng/dL   POCT Glucose    Collection Time: 06/16/19  3:47 PM   Result Value Ref Range    POC Glucose 220 (H) 60 - 115 mg/dl    Performed on ACCU-CHEK    POCT Glucose    Collection Time: 06/16/19  8:24 PM   Result Value Ref Range    POC Glucose 238 (H) 60 - 115 mg/dl    Performed on ACCU-CHEK    Basic Metabolic Panel    Collection Time: 06/17/19  5:53 AM   Result Value Ref Range    Sodium 139 135 - 144 mEq/L    Potassium 4.2 3.4 - 4.9 mEq/L    Chloride Laboratory Studies and ECG as noted below. Patient will follow up with their primary physician for general care. The patient knows to contact medical care earlier if need be. Razia Sarabia MD, [address] [facility] Cardiology      Of Note:  [facility] voice recognition dictation software was utilized partially in the preparation of this note, therefore, inaccuracies in spelling, word choice and punctuation may have occurred which were not recognized the time of signing. Chief Complaint  Cardiology Reason for Visit_NOH: The patient is being seen for a 6 month follow-up. Active Problems   1. Arteriosclerosis of carotid artery (433.10) (I65.29)   S/P RAZIA Stent 5/18/15   Hx CA Stent Left,      RIght CA >70% , US 4/15   2. CAD S/P percutaneous coronary angioplasty (414.01,V45.82) (I25.10,Z98.61)   3. Carotid bruit (785.9) (R09.89)   4. Chronic systolic congestive heart failure (428.22,428.0) (I50.22)   5. Diabetes mellitus (250.00) (E11.9)   6. REYES (dyspnea on exertion) (786.09) (R06.09)   7. Encounter to discuss test results (V65.49) (Z71.2)   8. Former smoker (V15.82) (J35.262)   · quit smoking around age 28 smoked up to 1/2 PPD x 10 years   5. High risk medication use (V58.69) (Z79.899)   10. Hyperlipidemia (272.4) (E78.5)   11. Hypertension (401.9) (I10)   12. Hypothyroidism (244.9) (E03.9)   Followed by Dr. Leopoldo Pert   13. ICD (implantable cardioverter-defibrillator), dual, in situ (V45.02) (Z95.810)   14. Ischemic cardiomyopathy (414.8) (I25.5)   15. Leg pain, bilateral (729.5) (M79.604,M79.605)   16. Paroxysmal ventricular tachycardia (427.1) (I47.2)   17. Past myocardial infarction (412) (I25.2)   18. Peripheral vascular disease (443.9) (I73.9)   Hx PTA Right AT and PT 3/15 [facility] Dr Nayeli Loza   19. Renal insufficiency (593.9) (N28.9)   20. S/P CABG (coronary artery bypass graft) (V45.81) (Z95.1)   s/p CABG 1992 x 4V , L-> LAD, S-> LCX, Diag, RCA    Family History   1.  Family history of CABG (CABG) : Brother   2. Family history of  : Mother, Father   3. Family history of acute myocardial infarction (V17.3) (Z82.49) : Brother   4. Family history of cardiac disorder (V17.49) (Z82.49) : Mother, Father   5. Family history of type 2 diabetes mellitus (V18.0) (Z83.3) : Maternal Grandmother    Social History   · Caffeine use (V49.89) (Z78.9)   · Former smoker (V15.82) (O41.663)   · No drug use   · Rarely consumes alcohol (V49.89) (Z78.9)    Current Meds   1. Amiodarone HCl - 100 MG Oral Tablet; TAKE 1 TABLET DAILY; Therapy: (Recorded:2019) to Recorded   2. Aspirin 81 MG Oral Tablet Delayed Release; 1 tablet daily; Therapy: 94TBA1893 to (Evaluate:2019)  Requested for: 2018; Last   Rx:2018 Ordered   3. Carvedilol 25 MG Oral Tablet; Take one tablet by mouth two times a day; Therapy: (Erika Oleary) to Recorded   4. Clopidogrel Bisulfate 75 MG Oral Tablet; TAKE 1 TABLET DAILY; Therapy: 82YED3639 to (Evaluate:2017) Recorded   5. Ferrous Gluconate 324 MG TABS; TAKE 1 TABLET TWICE DAILY; Therapy: (Recorded:12Kjq5376) to Recorded   6. Furosemide 20 MG Oral Tablet; take half tab daily; Therapy: (Erika Oleary) to Recorded   7. Gabapentin 400 MG Oral Capsule; TAKE 1 CAPSULE 3 TIMES DAILY; Therapy: (Recorded:2019) to Recorded   8. Isosorbide Mononitrate ER 60 MG Oral Tablet Extended Release 24 Hour; TAKE 1   TABLET ONCE DAILY; Therapy: (Recorded:44Mww1266) to Recorded   9. Lantus 100 UNIT/ML Subcutaneous Solution; Inject 12 units at bedtime; Therapy: (Recorded:2019) to Recorded   10. Levothyroxine Sodium 112 MCG Oral Tablet; Take one tablet by mouth every day; Therapy: 52UHY4720 to (Evaluate:2017); Last Rx:2016 Ordered   11. Magnesium Oxide 400 MG Oral Capsule; TAKE 1 TABLET TWICE A DAY; Therapy: (Recorded:07Tly5621) to Recorded   12.  Nitroglycerin 0.4 MG Sublingual Tablet Sublingual; PLACE 1 TABLET UNDER THE    TONGUE EVERY 5 MINUTES UP TO 3 DOSES AS NEEDED FOR CHEST PAIN;    Therapy: 61BXB6623 to (Evaluate:67Zuc3670)  Requested for: 92GTG4820; Last    Rx:05Jan2017 Ordered   13. Rosuvastatin Calcium 40 MG Oral Tablet; TAKE 1 TABLET DAILY; Therapy: (Recorded:04Agy7863) to Recorded    Reviewed medications with med list.     Allergies   1. ACE Inhibitors   Intolerance; Recorded By: Randa Davila; 04/10/2017 10:12:17 AM   2. Altace CAPS   Cough; Recorded By: Marci Nuñez; 06/03/2004 3:04:37 PM   3. Ranexa TB12   Vomiting; Recorded By: Caitlin Ramos; 11/26/2010 4:32:18 PM   4. Lipitor TABS   Recorded By: Lucy Davis; 01/25/2013 11:33:59 AM   5. Penicillins   Recorded By: Cata Dacosta; 06/26/1998 8:33:06 AM    Immunizations  PCV --- Marjessicae : 06-Mar-2017   PPSV --- Jaymee : 01-Jan-2007   Varicella --- Series1: 01-Jan-2013     Vitals  Vital Signs   Recorded: 01BMR6906 01:48PM   Height: 5 ft 10.5 in  Weight: 190 lb   BMI Calculated: 26.88  BSA Calculated: 2.05  Blood Pressure: 110 / 60, RUE, Sitting  Heart Rate: 62, Apical  Falls Screening: One fall without injury in the past year    Pt's weight obtained with shoes. Procedure    EKG done in office today; :   .     Assessment   1. REYES (dyspnea on exertion) (786.09) (R06.09)   2. CAD S/P percutaneous coronary angioplasty (414.01,V45.82) (I25.10,Z98.61)   3. Paroxysmal ventricular tachycardia (427.1) (I47.2)   4. Past myocardial infarction (412) (I25.2)   5. S/P CABG (coronary artery bypass graft) (V45.81) (Z95.1)   6. Ischemic cardiomyopathy (414.8) (I25.5)   7. ICD (implantable cardioverter-defibrillator), dual, in situ (V45.02) (Z95.810)   8. Chronic systolic congestive heart failure (428.22,428.0) (I50.22)   9. Arteriosclerosis of carotid artery (433.10) (I65.29)   10. Hypertension (401.9) (I10)   11. Hyperlipidemia (272.4) (E78.5)   12. Diabetes mellitus (250.00) (E11.9)   13. Peripheral vascular disease (443.9) (I73.9)   14. Renal insufficiency (593.9) (N28.9)   15.  History of Overweight (278.02) (E66.3)   16. High risk medication use (V58.69) (Z79.899)   17. Former smoker (V15.82) (Z95.168)    Plan   1. Renew: Nitroglycerin 0.4 MG Sublingual Tablet Sublingual (Nitrostat); PLACE 1 TABLET   UNDER THE TONGUE EVERY 5 MINUTES UP TO 3 DOSES AS NEEDED   FOR CHEST PAIN   2. Basic Metabolic Panel; Status:Active; Requested for:29Nov2019;   Call if Potassium result is <3.5 or >5.0 : YES   3. CBC; Status:Active; Requested for:29Nov2019;    4. EKG at next office visit.; Status:Active; Requested for:29May2019;    5. ESR; Status:Active; Requested for:29Nov2019;    6. High Sensitivity CRP; Status:Active; Requested for:29Nov2019;    7. Lipid Panel w/Reflex LDL; Status:Active; Requested for:29Nov2019;    8. LIVER PANEL; Status:Active; Requested for:29Nov2019;    9. TSH; Status:Active; Requested for:29Nov2019;    10. Access your medical record, request prescriptions, view laboratory and testing done in    our office, request appointments, view immunization records and message your provider    through our safe and secure patient portal. Ask a staff member how    to register or visit us at [email or web address] to get started today.;    Status:Complete;   Done: 88GFP5259   11. Drink plenty of fluids.; Status:Active; Requested for:29May2019;    12. Please bring all medicines, vitamins, and herbal supplements with you when you come    to the office.; Status:Active; Requested for:29May2019;    13. Prescriptions will not be filled unless you are compliant with your follow up appointments    or have a follow up appointments scheduled as per the instruction of your physician. Refills for medications should be requested at the time of your office visit.; Status:Active; Requested for:29May2019;    14. Thank you for being a patient at [facility]. Our goal is to    provide high quality medical care.   Following today's visit, please check your email for an    invitation to complete our patient satisfaction survey. By sharing your feedback, you will    help us continue our mission to provide excellent medical care. Your participation in the    survey is voluntary and completely confidential. We appreciate your thoughts regarding    today's visit.; Status:Active; Requested for:34Ref4644;    15. Tobacco use Hx Reported; Status:Complete;   Done: 23NYG7859   16. 6 month follow-up/call if interval problems. Evaluation and Treatment  Follow-up  Status:    Active  Requested for: 57CUC6140    Patient provided Falls Prevention education sheet. Message  Scribe:   Annemarie COSTA am scribing for, and in the presence of Dr. Newton Landaverde MD, [facility].      Signatures  Electronically signed by : Vladislav Basurto MA; May 29 2019  1:49PM EST                        Electronically signed by : Carine Luevano CMA; May 29 2019  2:42PM EST                        Electronically signed by : Cody Brown M.D.; Jun 2 2019  5:02PM EST

## 2019-06-18 NOTE — PROGRESS NOTES
Wife is at the bedside and Dr. Sosa Escalante is reviewing the results with the patient and family. Right groin remains soft and dry.

## 2019-06-18 NOTE — FLOWSHEET NOTE
Aptt drawn after heparin gtt initiated, heparin bolus was also given. Critical aptt discussed with NP. Will review and adjust gtt depending on aptt 6 hrs post initiation of heparin per protocol/order.  Electronically signed by Radha Casper RN on 6/18/2019 at 1:26 AM

## 2019-06-18 NOTE — PROCEDURES
[address] [address] [address]  [address], [address] [facility]     CARDIAC CATHETERIZATION       PATIENT NAME:  Bry Holley                       :          1940  MED REC NO:    [id number]                         ACCOUNT NO:  [de-identified]     PROVIDER:      Rick Sousa MD     DATE OF PROCEDURE:  19     PROCEDURES:    Left heart catheterization,   Right heart catheterization,  [place]-Shefali right heart hemodynamics with saturation and cardiac output (Ross /thermodilution),  Selective coronary cineangiography,   Selective left ventriculography,  Conscious sedation. INDICATIONS:   81-year-old male with known coronary artery disease status post previous myocardial infarctions coronary artery bypass grafting, severe ischemic cardiomyopathy post AICD now with ventricular tachycardia and AICD shocks and syncope. He also has moderate renal insufficiency with GFR approximately 30. Cardiac catheterization and the above procedures were recommended to guide therapy and possible surgical options. Risk goals and alternatives of the procedure were discussed in detail with the patient. He understood and wished to proceed. TECHNIQUE:  After obtaining informed consent, the patient was brought to  the catheterization laboratory, where the right groin was prepped and draped in the usual sterile fashion. Using 1% local Xylocaine anesthesia, 4-Austrian right femoral arterial sheath was placed without difficulty. A 7 Austrian right femoral venous sheath was placed without difficulty. 7 Western Anitra [place]-Shefali balloon tipped catheter was used to perform right heart hemodynamics saturation and cardiac output with both Ross and thermodilution. [place]-Shefali catheter was placed in the pulmonary wedge position, pulmonary artery, right ventricle and right atrial positions.   A 4-Austrian pigtail catheter was placed across the aortic valve in the left ventricle and left ventriculography was performed using 20 mL of

## 2019-06-18 NOTE — CONSULTS
[address]                      [address], [address] [facility]                                  CONSULTATION    PATIENT NAME: Addison Thomas                    :        1940  MED REC NO:   [id number]                            ROOM:       [id number]  ACCOUNT NO:   [de-identified]                           ADMIT DATE: 06/15/2019  PROVIDER:     Tank Menjivar MD    CONSULT DATE:  2019    REFERRING PROVIDER:  Dr. Carlos Guzmán. REASON FOR CONSULT:  Management of elevated TSH, hypothyroidism. CHIEF COMPLAINT AND HISTORY OF PRESENT ILLNESS:  The patient is a  67-year-old male with known history of type 2 diabetes, hypothyroidism,  and coronary artery disease, status post CABG, admitted with syncopal  episode. This happened while he was riding his . AICD  interrogation in the ER reflected V-tach with _____ episode terminated  by shock from AICD device. The patient was started on amiodarone bolus  for that followed by a drip. The patient has been on levothyroxine 112  mcg daily and has also been on p.o. amiodarone 200 mg. As per his home  medications, TSH was elevated at 17.1. TSH checked three years ago was  1.730, has been compliant with his Synthroid dose. The patient also has  type 2 diabetes, currently he is on Lantus 16 units at night with  Humalog sliding scale coverage. Has left foot boot and recently had  amputation of his toes through podiatrist at [facility]. PAST MEDICAL HISTORY:  Significant for type 2 diabetes, hypothyroidism,  peripheral vascular disease, hypertension, and coronary artery disease. PRIOR SURGICAL HISTORY:  Significant for endoscopy, CABG, colonoscopy,  and cholecystectomy. FAMILY HISTORY:  Heart disease and stroke. PERSONAL AND SOCIAL HISTORY:  Denies any smoking. ALLERGIES:  Include RANOLAZINE, ALTACE, LIPITOR, LISINOPRIL, and  PENICILLIN.     HOME MEDICATIONS:  The patient's home medications included Coreg,  Glucotrol, Lantus 16 at night, Imdur, Synthroid 112 mcg daily, Neurontin  300 mg three times a day, Zocor, Nitrostat, magnesium, vitamin B12,  amiodarone, aspirin, Plavix, Colace, iron, Lasix, Carafate, vitamin C,  and Protonix. MEDICATIONS:  Here include Lantus, Humalog, Benadryl, Valium, Plavix,  Coreg, Lovenox, Neurontin, Imdur, Synthroid 112 mcg daily, prednisone 50  mg one dose, Zocor, amiodarone IV bolus, and Lasix, also IV. REVIEW OF SYSTEMS:  Other than syncopal episode, 14-point review of  system was negative. PHYSICAL EXAMINATION:  GENERAL:  The patient is alert and awake, in no obvious distress. VITAL SIGNS:  Blood pressure was 116/56, pulse rate was 59, respirations  are 18, and temperature was 97.5. HEENT:  Reveals no jaundice. Tongue was moist.  NECK:  Reveals no goiter or thyromegaly. LUNGS:  Showing bilateral clear equal air entry. HEART:  Heart sounds were showing normal heart rate. No murmurs or  thrills were present. ABDOMEN:  Soft, nonobese, and nontender. No organomegaly. Bowel sounds  are present. EXTREMITIES:  Reveal no edema. Left foot in a boot. SKIN:  Otherwise intact. Scar of surgery noted over mid sternal area. NEUROLOGICAL EXAMINATION:  Power 5/5 bilaterally in upper and lower  extremities. PSYCH EXAM:  Essentially unremarkable. LABORATORIES:  Sodium 139, potassium 4.2, chloride 102, CO2 was 25, BUN  35, creatinine 2.08, and glucose was 158. TSH was at 70, free T4 was  1.59, and troponin I was 0.019.    ASSESSMENT:  Hypothyroidism worsened with amiodarone use. The patient  does have fatigue, V-tach resulting in syncopal episode, coronary artery  disease, and type 2 diabetes with recent amputation of left toe. PLAN:  At this time, we will increase Synthroid dose to 137 mcg daily. Continue other supportive measures with insulin. Cardiology to continue  with amiodarone.   Goal will be to get his TSH below 10 with labs done in  6 to 8 weeks' time.    Thank you for the consult.         Volodymyr Balderas MD    D: 06/17/2019 21:31:53       T: 06/18/2019 0:03:37     [email or web address]  Job#: [id number]     Doc#: [id number]    CC:

## 2019-06-18 NOTE — PROGRESS NOTES
CARDIOLOGY [address] PROGRESS NOTE         6/18/2019      Antwon Patten    [id number]  1940    Rounding MD: Sameer Monroy MD ,[facility] [place]    Primary Cardiologist:  Lakisha Stewart MD      SUBJECTIVE:    Patient is doing well overall. Has had good diuresis. Nephrology is seen in his cleared him for catheterization today. Plan cardiac catheterization today with further evaluation following. Cardiac and general ROS otherwise negative and unchanged. ASSESSMENT:      1. Syncope  2. VT Recurrent   3. Elevated troponins, low flat. 4. Renal insufficiency, Acute on Chronic. 5. Coronary artery disease, status post coronary bypass surgery in 1992 with patent LIMA to the LAD, SVG to obtuse marginal, 2011 with coronary angioplasty multivessel to the diagonal, left circumflex, obtuse marginal, 2011. Post repeat catheterization 2013 with no progressive disease and 2 of 4 patent bypass grafts. 6. Ischemic cardiomyopathy, ejection fraction 15-30%. 7. Status post AICD 2010 upgraded 2016.  8. Compensated congestive heart failure. 9. Hypertension. 10. Hyperlipidemia. 11. Diabetes. 12. Hypothyroidism. 13. Peripheral vascular disease status post right anterior tibial posterior tibial angioplasty with prior left superficial femoral artery angioplasty. 14. Tobacco abuse, past.  15. Degenerative joint disease. History of right rotator cuff  repair. 16. Chronic lower back pain. 17. Status post cholecystectomy. 18. Carotid artery disease status post bilateral carotid stenting. 19. Anemia. 20. History of left toe diabetic ulcer post amputation 2019  Otherwise as per assessment below. PLAN:    Cardiovascular supportive care. Continue amiodarone. Adjust medications as tolerated. Change IV Lasix gtt and to IV twice daily dosing. Nephrology evaluation  Cardiac catheterization today. Electrophysiology evaluation. Further recommendations to follow. See orders.       OBJECTIVE:     MEDICATIONS:     Scheduled Meds:   furosemide  80 mg Intravenous BID    sodium chloride flush  10 mL Intravenous 2 times per day    diazepam  5 mg Oral 30 Min Pre-Op    predniSONE  50 mg Oral Once    diphenhydrAMINE  50 mg Oral Once    levothyroxine  137 mcg Oral Daily    sodium chloride flush  10 mL Intravenous 2 times per day    sodium chloride flush  10 mL Intravenous 2 times per day    insulin lispro  0-12 Units Subcutaneous TID WC    insulin lispro  0-6 Units Subcutaneous Nightly    clopidogrel  75 mg Oral Daily    docusate sodium  100 mg Oral BID    ferrous sulfate  325 mg Oral Daily with breakfast    gabapentin  300 mg Oral TID    pantoprazole  20 mg Oral Daily    vitamin B-12  500 mcg Oral Daily    magnesium oxide  400 mg Oral Daily    simvastatin  20 mg Oral Nightly    carvedilol  25 mg Oral BID    insulin glargine  16 Units Subcutaneous Nightly    isosorbide mononitrate  60 mg Oral Daily     Continuous Infusions:   0.45 % NaCl with KCl 20 mEq Stopped (06/18/19 0850)    sodium chloride 75 mL/hr at 06/18/19 0851    amiodarone 450mg/250ml D5W infusion Stopped (06/17/19 1844)    dextrose       PRN Meds:sodium chloride flush, sodium chloride flush, sodium chloride flush, heparin (porcine), heparin (porcine), acetaminophen, melatonin, sodium chloride flush, magnesium hydroxide, ondansetron, nitroGLYCERIN, glucose, dextrose, glucagon (rDNA), dextrose    PHYSICAL EXAM:    CURRENT VITALS: /70   Pulse 60   Temp 97.9 °F (36.6 °C) (Oral)   Resp 18   Ht 5' 11\" (1.803 m)   Wt 205 lb 7.5 oz (93.2 kg)   SpO2 98%   BMI 28.66 kg/m²     CONSTITUTIONAL:  awake, alert, cooperative, no apparent distress,   ENT:  Normocephalic, without obvious abnormality, atraumatic, sinuses nontender on palpation, external ears without lesions,  NECK:  Supple, symmetrical, trachea midline, no adenopathy, thyroid symmetric, not enlarged and no tenderness, skin normal  LUNGS:  No increased work of breathing, good air exchange, clear to auscultation bilaterally, no crackles or wheezing. CARDIOVASCULAR:  Normal apical impulse, regular rate and rhythm, normal S1 and S2,  and 2/6 murmur noted. AICD left Chest.  Mid incisional scars. ABDOMEN:  Obese, Normal bowel sounds, soft, non-distended, non-tender, no masses palpated, no hepatosplenomegally  EXTREMITIES:  Trace edema, Pulses strong throughout. NEUROLOGIC:  Awake, alert, oriented to name, place and time.  Following all commands and moving all extremties  SKIN:  no bruising or bleeding, normal skin color, texture, turgor and no rashes     Data:       LABS:  Recent Results (from the past 24 hour(s))   POCT Glucose    Collection Time: 06/17/19  4:04 PM   Result Value Ref Range    POC Glucose 177 (H) 60 - 115 mg/dl    Performed on ACCU-CHEK    D-Dimer, Quantitative    Collection Time: 06/17/19  7:58 PM   Result Value Ref Range    D-Dimer, Quant 0.99 (HH) 0.00 - 0.50 mg/L FEU   POCT Glucose    Collection Time: 06/17/19  9:11 PM   Result Value Ref Range    POC Glucose 197 (H) 60 - 115 mg/dl    Performed on ACCU-CHEK    CBC    Collection Time: 06/17/19 10:55 PM   Result Value Ref Range    WBC 9.3 4.8 - 10.8 K/uL    RBC 3.34 (L) 4.70 - 6.10 M/uL    Hemoglobin 9.6 (L) 14.0 - 18.0 g/dL    Hematocrit 28.6 (L) 42.0 - 52.0 %    MCV 85.7 80.0 - 100.0 fL    MCH 28.6 27.0 - 31.3 pg    MCHC 33.4 33.0 - 37.0 %    RDW 17.0 (H) 11.5 - 14.5 %    Platelets 454 579 - 165 K/uL   APTT    Collection Time: 06/17/19 10:55 PM   Result Value Ref Range    aPTT >124.0 (HH) 21.6 - 35.4 sec   EKG 12 Lead    Collection Time: 06/18/19  1:05 AM   Result Value Ref Range    Ventricular Rate 61 BPM    Atrial Rate 234 BPM    P-R Interval 280 ms    QRS Duration 144 ms    Q-T Interval 502 ms    QTc Calculation (Bazett) 505 ms    R Axis 56 degrees    T Axis 263 degrees   CBC Auto Differential    Collection Time: 06/18/19  4:32 AM   Result Value Ref Range    WBC 7.8 4.8 - 10.8 K/uL    RBC 3.35 (L) 4.70 - 6.10 M/uL    Hemoglobin 9.4 (L) 14.0 - 18.0 g/dL    Hematocrit 29.1 (L) 42.0 - 52.0 %    MCV 86.8 80.0 - 100.0 fL    MCH 28.0 27.0 - 31.3 pg    MCHC 32.2 (L) 33.0 - 37.0 %    RDW 16.9 (H) 11.5 - 14.5 %    Platelets 269 (L) 620 - 400 K/uL    Neutrophils % 78.8 %    Lymphocytes % 11.0 %    Monocytes % 8.2 %    Eosinophils % 1.2 %    Basophils % 0.8 %    Neutrophils # 6.1 1.4 - 6.5 K/uL    Lymphocytes # 0.9 (L) 1.0 - 4.8 K/uL    Monocytes # 0.6 0.2 - 0.8 K/uL    Eosinophils # 0.1 0.0 - 0.7 K/uL    Basophils # 0.1 0.0 - 0.2 K/uL   Ferritin    Collection Time: 06/18/19  4:32 AM   Result Value Ref Range    Ferritin 34.7 30.0 - 400.0 ng/mL   Iron and TIBC    Collection Time: 06/18/19  4:32 AM   Result Value Ref Range    Iron 29 (L) 59 - 158 ug/dL    TIBC 356 178 - 450 ug/dL    Iron Saturation 8 (L) 11 - 46 %   Comprehensive Metabolic Panel    Collection Time: 06/18/19  4:32 AM   Result Value Ref Range    Sodium 139 135 - 144 mEq/L    Potassium 3.5 3.4 - 4.9 mEq/L    Chloride 99 95 - 107 mEq/L    CO2 26 20 - 31 mEq/L    Anion Gap 14 9 - 15 mEq/L    Glucose 176 (H) 70 - 99 mg/dL    BUN 35 (H) 8 - 23 mg/dL    CREATININE 2.06 (H) 0.70 - 1.20 mg/dL    GFR Non-African American 31.4 (L) >60    GFR  38.0 (L) >60    Calcium 8.3 (L) 8.5 - 9.9 mg/dL    Total Protein 5.7 (L) 6.3 - 8.0 g/dL    Alb 3.2 (L) 3.5 - 4.6 g/dL    Total Bilirubin 1.1 (H) 0.2 - 0.7 mg/dL    Alkaline Phosphatase 135 (H) 35 - 104 U/L    ALT 15 0 - 41 U/L    AST 15 0 - 40 U/L    Globulin 2.5 2.3 - 3.5 g/dL   Magnesium    Collection Time: 06/18/19  4:32 AM   Result Value Ref Range    Magnesium 1.8 1.7 - 2.4 mg/dL   Troponin    Collection Time: 06/18/19  4:32 AM   Result Value Ref Range    Troponin 0.026 () 0.000 - 0.010 ng/mL   Sedimentation Rate    Collection Time: 06/18/19  4:33 AM   Result Value Ref Range    Sed Rate 20 0 - 20 mm   APTT    Collection Time: 06/18/19  4:33 AM   Result Value Ref Range    aPTT >124.0 () 21.6 - 35.4 sec   POCT Glucose    Collection Time: 06/18/19  6:00 AM   Result Value Ref Range    POC Glucose 165 (H) 60 - 115 mg/dl    Performed on ACCU-CHEK          EKG:  SR, Paced    Echo:   LVEF 15% Dilated. LexiScan Stress Test:  No Ischemia noted. Multiple fixed defects. LVEF 30%. Cynthia Cummings MD ,[address] [facility]        ==============================    Subjective  PCP: Primary Care Physician is Dr. Daphne Rome   Subjective:   05/29/2019 - Chelsey Rice was seen in cardiac evaluation at the [facility] office 05/29/2019. The patients problems are listed in the impression below. Electronic medical records reviewed. Patient returns. He's lost 20 pounds. He is lost his appetite. He recently had a diabetic foot ulcer status post left toe amputation. He denies any chest pain, shortness breath, lightheadedness, TIA or CVA since. He's had no claudication symptoms    Cardiovascular and general review of systems is otherwise negative. A 14-system review is otherwise negative, other than noted. ELECTROCARDIOGRAM: Sinus rhythm, PVCs, bigeminy. Rate 60. LABORATORY DATA: Cholesterol 78, triglyceride 51, HDL 42, LDL 34. Chem-7, CBC normal except for hemoglobin 11.9, platelet 04,534, Creatinine 2.1, GFR 31. Otherwise as noted below. All above testing was personally reviewed. PHYSICAL EXAMINATION:   General: No acute distress. Vital signs as noted. Alert and  oriented. Head And Neck Examination: No jugular venous distention, grade 1/6  bilateral carotid bruits, no mass. Carotid upstrokes preserved. Oral mucosa moist. No xanthelasma. Head and neck examination  otherwise unremarkable. Lungs: Clear to auscultation and percussion. No wheezes, no rales,  and no rhonchi. Chest: AICD left chest, well-healed incision. Heart: Normal S1 and S2. No S3. No S4. No rub. No murmur. Grade  1/6 systolic murmur best heard in left sternal border.  Point of  maximal impulse was decreased and lateral.  Abdomen: Soft. Nontender. No organomegaly. No bruits. No masses. Extremities: No edema. No clubbing. No cyanosis. Pulses are  strong throughout. No bruits. Musculoskeletal Exam: No ulcers, otherwise unremarkable. Neuro: Neurologically appeared grossly intact. IMPRESSION:     1. Cardiovascular status, stable. 2. Asymptomatic  3. Coronary artery disease, status post coronary bypass surgery in 1992 with patent LIMA to the LAD, SVG to obtuse marginal, 2011 with coronary angioplasty multivessel to the diagonal, left circumflex, obtuse marginal, 2011. Post repeat catheterization 2013 with no progressive disease and 2 of 4 patent bypass grafts. 4. Ischemic cardiomyopathy, ejection fraction 20%. Increase in 30% by recent echocardiogram of October 2016.  5. Status post AICD 2010 upgraded 2016. 6. Compensated congestive heart failure. 7. Hypertension. 8. Hyperlipidemia. 9. Diabetes. 10. Hypothyroidism. 11. Peripheral vascular disease status post right anterior tibial posterior tibial angioplasty with prior left superficial femoral artery angioplasty. 12. Tobacco abuse, past.  13. Degenerative joint disease. History of right rotator cuff  repair. 14. Chronic lower back pain. 15. Status post cholecystectomy. 16. Carotid artery disease status post bilateral carotid stenting. 17. Anemia. 18. Renal insufficiency. 23. History of left toe diabetic ulcer post amputation 2019  Otherwise as per assessment below. RECOMMENDATIONS:     Would suggest continuing current medications. We'll follow up with the [place]  for his primary issues. Refills were provided. Exercise dietary weight reduction program. Hydration. We will plan to see back in 6 months with Laboratory Studies and ECG as noted below. Patient will follow up with their primary physician for general care. The patient knows to contact medical care earlier if need be.       Hiral Nascimento MD, [facility] / [address] [facility] Card[facility]      Of Note:  Dragon voice recognition dictation software was utilized partially in the preparation of this note, therefore, inaccuracies in spelling, word choice and punctuation may have occurred which were not recognized the time of signing. Chief Complaint  Cardiology Reason for Visit_NOH: The patient is being seen for a 6 month follow-up. Active Problems   1. Arteriosclerosis of carotid artery (433.10) (I65.29)   S/P RAZIA Stent 5/18/15   Hx CA Stent Left,      RIght CA >70% , US 4/15   2. CAD S/P percutaneous coronary angioplasty (414.01,V45.82) (I25.10,Z98.61)   3. Carotid bruit (785.9) (R09.89)   4. Chronic systolic congestive heart failure (428.22,428.0) (I50.22)   5. Diabetes mellitus (250.00) (E11.9)   6. REYES (dyspnea on exertion) (786.09) (R06.09)   7. Encounter to discuss test results (V65.49) (Z71.2)   8. Former smoker (V15.82) (R67.038)   · quit smoking around age 28 smoked up to 1/2 PPD x 10 years   5. High risk medication use (V58.69) (Z79.899)   10. Hyperlipidemia (272.4) (E78.5)   11. Hypertension (401.9) (I10)   12. Hypothyroidism (244.9) (E03.9)   Followed by Dr. Eddie Gomez   13. ICD (implantable cardioverter-defibrillator), dual, in situ (V45.02) (Z95.810)   14. Ischemic cardiomyopathy (414.8) (I25.5)   15. Leg pain, bilateral (729.5) (M79.604,M79.605)   16. Paroxysmal ventricular tachycardia (427.1) (I47.2)   17. Past myocardial infarction (412) (I25.2)   18. Peripheral vascular disease (443.9) (I73.9)   Hx PTA Right AT and PT 3/15 [facility] Dr Magi Loya   19. Renal insufficiency (593.9) (N28.9)   20. S/P CABG (coronary artery bypass graft) (V45.81) (Z95.1)   s/p CABG  x 4V , L-> LAD, S-> LCX, Diag, RCA    Family History   1. Family history of CABG (CABG) : Brother   2. Family history of  : Mother, Father   3. Family history of acute myocardial infarction (V17.3) (Z82.49) : Brother   4. Family history of cardiac disorder (V17.49) (Z82.49) : Mother, Father   5.  Family history of type 2 diabetes mellitus (V18.0) (Z83.3) : Maternal Grandmother    Social History   · Caffeine use (V49.89) (Z78.9)   · Former smoker (V15.82) (X00.457)   · No drug use   · Rarely consumes alcohol (V49.89) (Z78.9)    Current Meds   1. Amiodarone HCl - 100 MG Oral Tablet; TAKE 1 TABLET DAILY; Therapy: (Recorded:24Pnr4050) to Recorded   2. Aspirin 81 MG Oral Tablet Delayed Release; 1 tablet daily; Therapy: 29VNU0338 to (Evaluate:23Mar2019)  Requested for: 02Apr2018; Last   Rx:28Mar2018 Ordered   3. Carvedilol 25 MG Oral Tablet; Take one tablet by mouth two times a day; Therapy: (Jessika Oneal) to Recorded   4. Clopidogrel Bisulfate 75 MG Oral Tablet; TAKE 1 TABLET DAILY; Therapy: 02RNJ0345 to (Evaluate:07Oct2017) Recorded   5. Ferrous Gluconate 324 MG TABS; TAKE 1 TABLET TWICE DAILY; Therapy: (Recorded:97Bko8554) to Recorded   6. Furosemide 20 MG Oral Tablet; take half tab daily; Therapy: (Jessika Oneal) to Recorded   7. Gabapentin 400 MG Oral Capsule; TAKE 1 CAPSULE 3 TIMES DAILY; Therapy: (Recorded:69Odz0717) to Recorded   8. Isosorbide Mononitrate ER 60 MG Oral Tablet Extended Release 24 Hour; TAKE 1   TABLET ONCE DAILY; Therapy: (Recorded:64Pzh7141) to Recorded   9. Lantus 100 UNIT/ML Subcutaneous Solution; Inject 12 units at bedtime; Therapy: (Recorded:46Pex7618) to Recorded   10. Levothyroxine Sodium 112 MCG Oral Tablet; Take one tablet by mouth every day; Therapy: 96ZDH0734 to (Evaluate:22Jun2017); Last Rx:27Jun2016 Ordered   11. Magnesium Oxide 400 MG Oral Capsule; TAKE 1 TABLET TWICE A DAY; Therapy: (Recorded:45Qhl2165) to Recorded   12. Nitroglycerin 0.4 MG Sublingual Tablet Sublingual; PLACE 1 TABLET UNDER THE    TONGUE EVERY 5 MINUTES UP TO 3 DOSES AS NEEDED FOR CHEST PAIN;    Therapy: 38UKB4850 to (Evaluate:03Jul2017)  Requested for: 88DRN2542; Last    Rx:33Hvc4358 Ordered   13. Rosuvastatin Calcium 40 MG Oral Tablet; TAKE 1 TABLET DAILY;     Therapy: (Recorded:96Cyk5786) to Recorded    Reviewed visit.; Status:Active; Requested for:64Dgu8702;    15. Tobacco use Hx Reported; Status:Complete;   Done: 24KKI0443   16. 6 month follow-up/call if interval problems. Evaluation and Treatment  Follow-up  Status:    Active  Requested for: 02IMS5544    Patient provided Falls Prevention education sheet. Message  Scribe:   [place] am scribing for, and in the presence of Dr. Melina Lizama MD, [facility] [place].      Signatures  Electronically signed by : Tani Burnham MA; May 29 2019  1:49PM EST                        Electronically signed by : Lorraine Dumont CMA; May 29 2019  2:42PM EST                        Electronically signed by : Sera Malcolm M.D.; Jun 2 2019  5:02PM EST

## 2019-06-18 NOTE — FLOWSHEET NOTE
Page to [facility] to clarify heparin orders due to patient on cath lab Schedule at 11AM today. Informed of aptt greater than 124. Directed to follow heparin protocol per order and NOT to d/c at this time.  Electronically signed by Evertt Eisenmenger, RN on 6/18/2019 at 6:43 AM

## 2019-06-18 NOTE — PROGRESS NOTES
with KCl 20 mEq Stopped (06/18/19 0850)    sodium chloride 75 mL/hr at 06/18/19 0851    amiodarone 450mg/250ml D5W infusion Stopped (06/17/19 1844)    dextrose         CBC:   Recent Labs     06/17/19  2255 06/18/19  0432   WBC 9.3 7.8   HGB 9.6* 9.4*    124*     CMP:    Recent Labs     06/16/19  0633 06/17/19  0553 06/18/19  0432    139 139   K 4.4 4.2 3.5    102 99   CO2 25 25 26   BUN 34* 35* 35*   CREATININE 1.88* 2.08* 2.06*   GLUCOSE 192* 158* 176*   CALCIUM 8.4* 8.6 8.3*   LABGLOM 34.9* 31.0* 31.4*     Troponin:   Recent Labs     06/18/19  0432   TROPONINI 0.026*     BNP: No results for input(s): BNP in the last 72 hours. INR:   Recent Labs     06/15/19  1507   INR 1.2     Lipids:   Recent Labs     06/16/19  0633   CHOL 66   TRIG 51   HDL 29*     Liver:   Recent Labs     06/18/19  0432   AST 15   ALT 15   ALKPHOS 135*   PROT 5.7*   LABALBU 3.2*   BILITOT 1.1*     Iron:    Recent Labs     06/18/19  0432   FERRITIN 34.7     Urinalysis: No results for input(s): UA in the last 72 hours.     Objective:  Vitals: /70   Pulse 60   Temp 97.9 °F (36.6 °C) (Oral)   Resp 18   Ht 5' 11\" (1.803 m)   Wt 205 lb 7.5 oz (93.2 kg)   SpO2 98%   BMI 28.66 kg/m²    Wt Readings from Last 3 Encounters:   06/18/19 205 lb 7.5 oz (93.2 kg)   04/28/16 196 lb 3.4 oz (89 kg)   05/01/15 203 lb (92.1 kg)      24HR INTAKE/OUTPUT:      Intake/Output Summary (Last 24 hours) at 6/18/2019 1202  Last data filed at 6/18/2019 0851  Gross per 24 hour   Intake 1169.55 ml   Output 1925 ml   Net -755.45 ml       General: alert, in no apparent distress  HEENT: normocephalic, atraumatic, anicteric  Neck: supple, no mass  Lungs: non-labored respirations, clear to auscultation bilaterally  Heart: regular rate and rhythm, no murmurs or rubs  Abdomen: soft, non-tender, non-distended  Ext: no cyanosis, 1+ BLE peripheral edema (L>R)  Neuro: alert and oriented, no gross abnormalities  Psych: normal mood and affect  Skin: no rash      Electronically signed by Tyson Nielsen MD

## 2019-06-18 NOTE — FLOWSHEET NOTE
Attending notified of elevated ddimer.  Electronically signed by Marlee Denise RN on 6/17/2019 at 9:21 PM

## 2019-06-18 NOTE — FLOWSHEET NOTE
Patients aptt greater than 124. Heparin now on hold and will decrease by 3 units when restarting. Notified hospitalist, no further orders.  Electronically signed by Angus Sood RN on 6/18/2019 at 5:51 AM

## 2019-06-18 NOTE — PROGRESS NOTES
Hospitalist Progress Note      Date of Admission: 6/15/2019  Chief Complaint:    Chief Complaint   Patient presents with    Fall     fell from       Subjective:  No new complaints.   No nausea, vomiting, chest pain, or headache       Medications:    Infusion Medications    0.45 % NaCl with KCl 20 mEq Stopped (06/18/19 0850)    sodium chloride 75 mL/hr at 06/18/19 0851    amiodarone 450mg/250ml D5W infusion Stopped (06/17/19 1844)    dextrose       Scheduled Medications    furosemide  80 mg Intravenous BID    iron sucrose  200 mg Intravenous Q24H    [START ON 6/21/2019] ferrous sulfate  325 mg Oral Daily with breakfast    sodium chloride flush  10 mL Intravenous 2 times per day    diazepam  5 mg Oral 30 Min Pre-Op    predniSONE  50 mg Oral Once    diphenhydrAMINE  50 mg Oral Once    levothyroxine  137 mcg Oral Daily    sodium chloride flush  10 mL Intravenous 2 times per day    sodium chloride flush  10 mL Intravenous 2 times per day    insulin lispro  0-12 Units Subcutaneous TID WC    insulin lispro  0-6 Units Subcutaneous Nightly    clopidogrel  75 mg Oral Daily    docusate sodium  100 mg Oral BID    gabapentin  300 mg Oral TID    pantoprazole  20 mg Oral Daily    vitamin B-12  500 mcg Oral Daily    magnesium oxide  400 mg Oral Daily    simvastatin  20 mg Oral Nightly    carvedilol  25 mg Oral BID    insulin glargine  16 Units Subcutaneous Nightly    isosorbide mononitrate  60 mg Oral Daily     PRN Meds: sodium chloride flush, sodium chloride flush, sodium chloride flush, heparin (porcine), heparin (porcine), acetaminophen, melatonin, sodium chloride flush, magnesium hydroxide, ondansetron, nitroGLYCERIN, glucose, dextrose, glucagon (rDNA), dextrose    Intake/Output Summary (Last 24 hours) at 6/18/2019 1459  Last data filed at 6/18/2019 0851  Gross per 24 hour   Intake 1169.55 ml   Output 1925 ml   Net -755.45 ml     Exam:  /70   Pulse 60   Temp 97.9 °F (36.6 °C) (Oral) Resp 15   Ht 5' 11\" (1.803 m)   Wt 205 lb 7.5 oz (93.2 kg)   SpO2 100%   BMI 28.66 kg/m²   Head: Normocephalic, atraumatic  Sclera clear  Neck supple, nontender  Lungs: clear    Labs:   Recent Labs     06/16/19  0633 06/17/19  2255 06/18/19  0432   WBC 6.5 9.3 7.8   HGB 9.3* 9.6* 9.4*   HCT 29.1* 28.6* 29.1*   * 130 124*     Recent Labs     06/15/19  1507 06/16/19  0633 06/17/19  0553 06/18/19  0432    139 139 139   K 5.3* 4.4 4.2 3.5    103 102 99   CO2 26 25 25 26   BUN 34* 34* 35* 35*   CREATININE 1.99* 1.88* 2.08* 2.06*   CALCIUM 8.4* 8.4* 8.6 8.3*   AST 19 19  --  15   ALT 20 18  --  15   BILITOT 0.9* 1.0*  --  1.1*   ALKPHOS 165* 139*  --  135*     Recent Labs     06/15/19  1507   INR 1.2     Recent Labs     06/15/19  1507  06/16/19  0633 06/16/19  1340 06/18/19  0432   CKTOTAL 206*  --   --   --   --    TROPONINI <0.010   < > 0.020* 0.019* 0.026*    < > = values in this interval not displayed. Radiology:  XR CHEST PORTABLE   Final Result   QUESTIONABLE INFILTRATE/ATELECTASIS RIGHT BASE. CT Head WO Contrast   Final Result   NO ACUTE INTRACRANIAL PATHOLOGY. CT CERVICAL SPINE WO CONTRAST   Final Result   No acute cervical spine abnormality. NM MYOCARDIAL SPECT REST EXERCISE OR RX    (Results Pending)     Assessment/Plan:    AICD discharge: Currently on antiarrhythmics, reassess coronary anatomy in order to confirm that there is no underlying ischemic etiology for current arrhythmias. Elevated TSH in the background of amiodarone: Endocrinology following    Chronic kidney disease, nephrology to assist in management given anticipated contrast exposure. Elevated d-dimer noted: Was ordered by cardiology. To be addressed by cardiology.     35 minutes total care time, >1/2 in unit/floor time and care coordination     Electronically signed by Harriette Goodell, MD on 6/18/2019 at 2:59 PM

## 2019-06-18 NOTE — PROGRESS NOTES
Report called to Peggy Fernandez on [address] [facility].  Right groin is soft and dry. Sandbag on . Patient is taking sips of pop.

## 2019-06-18 NOTE — PROGRESS NOTES
Patient is resting in bed and wife left to run some errands and right groin is soft and dry.   Will call report to Corie Zabala on [phone]

## 2019-06-18 NOTE — FLOWSHEET NOTE
Hospitalist notified of elevated trop.  Electronically signed by Vahid Lindsay RN on 6/18/2019 at 5:53 AM

## 2019-06-19 NOTE — DISCHARGE SUMMARY
dose to as low as reasonably achievable. FINDINGS CSF spaces: There is diffuse prominence of the CSF spaces of the ventricles and cerebral convexities consistent with significant volume loss. However there can be significant overlap in the appearance of age-related changes and neurodegenerative disease. Brain parenchyma: There is no parenchymal mass, or mass effect signs of acute infarct, acute hemorrhage or extra-axial fluid. There are bilateral scattered periventricular and juxtacortical hypodensities that may be related to microangiopathy, however there can be significant overlap between normal age-related changes and subcortical arteriosclerotic disease. Skull base: The bony calvarium and skull base are normal.  Chronic right maxillary sinusitis     NO ACUTE INTRACRANIAL PATHOLOGY. Ct Cervical Spine Wo Contrast    Result Date: 6/15/2019  EXAMINATION: CT CERVICAL SPINE WO CONTRAST   DATE AND TIME:6/15/2019 2:45 PM CLINICAL HISTORY:Acute posterior neck pain  syncope and fall  COMPARISON: None TECHNIQUE:Helical scanning was performed from the skull base through the remainder of the cervical spine without intravenous contrast. Sagittal and coronal reformats were obtained. The lack of contrast limits CT sensitivity of the soft tissues. All CT scans at this facility use dose modulation, iterative reconstruction, and/or weight based dosing when appropriate to reduce radiation dose to as low as reasonably achievable. FINDINGS      Alignment:  The cervical column shows normal cervical lordosis. . There is slight tilting of the cervical spine toward the right. There is no subluxation. Vertebrae: There is severe cervical spondylosis with multiple level disc osteophyte changes. No acute fracture. Soft tissues: The paraspinal soft tissues demonstrate a patent airway with no evidence of stenosis or mass.  Larynx is normal with no evidence for vocal cord dysfunction. The lymph nodes, prevertebral and retrovertebral soft tissues are within normal limits. Chronic right maxillary sinusitis Lung apices: The visualized soft tissues and lung fields show no significant finding. No acute cervical spine abnormality. 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 Equivocal changes at the right base. I doubt but cannot exclude a small infiltrate or atelectasis. Lungs otherwise clear. No overt pulmonary edema. QUESTIONABLE INFILTRATE/ATELECTASIS RIGHT BASE. Discharge Medications:       [facility]   Home Medication Instructions OSV:[id number]    Printed on:06/19/19 5744   Medication Information                      amiodarone (CORDARONE) 200 MG tablet  Take 200 mg by mouth daily Indications: patient taking 100mg daily now. aspirin 81 MG tablet  Take 81 mg by mouth daily             carvedilol (COREG) 25 MG tablet  Take 25 mg by mouth 2 times daily             clopidogrel (PLAVIX) 75 MG tablet  Take 75 mg by mouth daily             docusate sodium (COLACE) 100 MG capsule  Take 100 mg by mouth 2 times daily             ferrous sulfate 325 (65 FE) MG tablet  Take 325 mg by mouth daily (with breakfast)             gabapentin (NEURONTIN) 300 MG capsule  Take 600 mg by mouth 3 times daily. Take one(1) tablet three times daily. insulin glargine (LANTUS) 100 UNIT/ML injection vial  12 units bid             isosorbide mononitrate (IMDUR) 120 MG CR tablet  Take 120 mg by mouth daily Indications: patient taking 60 mg daily now.               levothyroxine (SYNTHROID) 137 MCG tablet  Take 1 tablet by mouth Daily             Magnesium Oxide 400 MG CAPS  Take 400 mg by mouth             nitroGLYCERIN (NITROSTAT) 0.4 MG SL tablet  Place 0.4 mg under the tongue             pantoprazole (PROTONIX) 40 MG tablet  Take 40 mg by mouth             simvastatin (ZOCOR) 20 MG tablet  Take by mouth             vitamin B-12 (CYANOCOBALAMIN) 500 MCG tablet  Take one(1) tablet daily. Disposition:   If discharged to Home, Any [facility] needs that were indicated and/or required as been addressed and set up by Social Work. Condition at discharge: Pt was medically stable at the time of discharge. Activity: activity as tolerated    Total time taken for discharging this patient: 40 minutes. Greater than 70% of time was spent focused exclusively on this patient. Time was taken to review chart, discuss plans with consultants, reconciling medications, discussing plan answering questions with patient.      Signed:  Ellen Sheldon  6/19/2019, 4:19 PM

## 2019-06-19 NOTE — FLOWSHEET NOTE
[facility], [place], operated by [facility] called - spoke with receiving RN, Mendy Pretty again. Updated on this afternoons lab draw results, as well as the infusion of IV iron given.  Electronically signed by Jerilyn Cardozo RN on 6/19/2019 at 2:30 PM

## 2019-06-19 NOTE — PROGRESS NOTES
diabetic ulcer post amputation 2019  Otherwise as per assessment below. PLAN:    Cardiovascular supportive care. Transfer to [address] [place] [address] service, for PCI today or tomorrow. Continue Amiodarone and cardiac medications as tolerated. Continue Lasix IV twice daily dosing. Replace KCL. Recheck later today. Nephrology continue care. Anemia evaluation and Transfuse if needed. Electrophysiology to follow. See orders.       OBJECTIVE:     MEDICATIONS:     Scheduled Meds:   furosemide  80 mg Intravenous BID    iron sucrose  200 mg Intravenous Q24H    [START ON 6/21/2019] ferrous sulfate  325 mg Oral Daily with breakfast    sodium chloride flush  10 mL Intravenous 2 times per day    sodium chloride flush  10 mL Intravenous 2 times per day    diazepam  5 mg Oral 30 Min Pre-Op    predniSONE  50 mg Oral Once    diphenhydrAMINE  50 mg Oral Once    levothyroxine  137 mcg Oral Daily    sodium chloride flush  10 mL Intravenous 2 times per day    sodium chloride flush  10 mL Intravenous 2 times per day    insulin lispro  0-12 Units Subcutaneous TID WC    insulin lispro  0-6 Units Subcutaneous Nightly    clopidogrel  75 mg Oral Daily    docusate sodium  100 mg Oral BID    gabapentin  300 mg Oral TID    pantoprazole  20 mg Oral Daily    vitamin B-12  500 mcg Oral Daily    magnesium oxide  400 mg Oral Daily    simvastatin  20 mg Oral Nightly    carvedilol  25 mg Oral BID    insulin glargine  16 Units Subcutaneous Nightly    isosorbide mononitrate  60 mg Oral Daily     Continuous Infusions:   0.45 % NaCl with KCl 20 mEq 50 mL/hr at 06/18/19 1658    amiodarone 450mg/250ml D5W infusion Stopped (06/17/19 1844)    dextrose       PRN Meds:sodium chloride flush, acetaminophen, magnesium hydroxide, sodium chloride flush, sodium chloride flush, sodium chloride flush, heparin (porcine), heparin (porcine), acetaminophen, melatonin, sodium chloride flush, magnesium hydroxide, ondansetron, nitroGLYCERIN, glucose, artery). 5.  SVG to OM graft in-stent restenosis 95% hazy as noted above. 6.  Severe global dilated cardiomyopathy with more profound inferior akinesia, LVEF< 20%. 7.  Moderate pulmonary hypertension  8. No evidence of intracardiac shunting by saturation run. Winnie Moreno MD ,[facility]        ==============================    Subjective  PCP: Primary Care Physician is Dr. Valery Galvez   Subjective:   05/29/2019 - Jacquelin Martinez was seen in cardiac evaluation at the [facility] office 05/29/2019. The patients problems are listed in the impression below. Electronic medical records reviewed. Patient returns. He's lost 20 pounds. He is lost his appetite. He recently had a diabetic foot ulcer status post left toe amputation. He denies any chest pain, shortness breath, lightheadedness, TIA or CVA since. He's had no claudication symptoms    Cardiovascular and general review of systems is otherwise negative. A 14-system review is otherwise negative, other than noted. ELECTROCARDIOGRAM: Sinus rhythm, PVCs, bigeminy. Rate 60. LABORATORY DATA: Cholesterol 78, triglyceride 51, HDL 42, LDL 34. Chem-7, CBC normal except for hemoglobin 11.9, platelet 72,134, Creatinine 2.1, GFR 31. Otherwise as noted below. All above testing was personally reviewed. PHYSICAL EXAMINATION:   General: No acute distress. Vital signs as noted. Alert and  oriented. Head And Neck Examination: No jugular venous distention, grade 1/6  bilateral carotid bruits, no mass. Carotid upstrokes preserved. Oral mucosa moist. No xanthelasma. Head and neck examination  otherwise unremarkable. Lungs: Clear to auscultation and percussion. No wheezes, no rales,  and no rhonchi. Chest: AICD left chest, well-healed incision. Heart: Normal S1 and S2. No S3. No S4. No rub. No murmur. Grade  1/6 systolic murmur best heard in left sternal border.  Point of  maximal impulse was decreased and lateral.  Abdomen: Soft. Nontender. No organomegaly. No bruits. No masses. Extremities: No edema. No clubbing. No cyanosis. Pulses are  strong throughout. No bruits. Musculoskeletal Exam: No ulcers, otherwise unremarkable. Neuro: Neurologically appeared grossly intact. IMPRESSION:     1. Cardiovascular status, stable. 2. Asymptomatic  3. Coronary artery disease, status post coronary bypass surgery in 1992 with patent LIMA to the LAD, SVG to obtuse marginal, 2011 with coronary angioplasty multivessel to the diagonal, left circumflex, obtuse marginal, 2011. Post repeat catheterization 2013 with no progressive disease and 2 of 4 patent bypass grafts. 4. Ischemic cardiomyopathy, ejection fraction 20%. Increase in 30% by recent echocardiogram of October 2016.  5. Status post AICD 2010 upgraded 2016. 6. Compensated congestive heart failure. 7. Hypertension. 8. Hyperlipidemia. 9. Diabetes. 10. Hypothyroidism. 11. Peripheral vascular disease status post right anterior tibial posterior tibial angioplasty with prior left superficial femoral artery angioplasty. 12. Tobacco abuse, past.  13. Degenerative joint disease. History of right rotator cuff  repair. 14. Chronic lower back pain. 15. Status post cholecystectomy. 16. Carotid artery disease status post bilateral carotid stenting. 17. Anemia. 18. Renal insufficiency. 23. History of left toe diabetic ulcer post amputation 2019  Otherwise as per assessment below. RECOMMENDATIONS:     Would suggest continuing current medications. We'll follow up with the [place] for his primary issues. Refills were provided. Exercise dietary weight reduction program. Hydration. We will plan to see back in 6 months with Laboratory Studies and ECG as noted below. Patient will follow up with their primary physician for general care. The patient knows to contact medical care earlier if need be.       Lore Jolley MD, [facility]      Of Note:  Dragon voice recognition dictation software was utilized partially in the preparation of this note, therefore, inaccuracies in spelling, word choice and punctuation may have occurred which were not recognized the time of signing. Chief Complaint  Cardiology Reason for Visit_NOH: The patient is being seen for a 6 month follow-up. Active Problems   1. Arteriosclerosis of carotid artery (433.10) (I65.29)   S/P RAZIA Stent 5/18/15   Hx CA Stent Left,      RIght CA >70% , US 4/15   2. CAD S/P percutaneous coronary angioplasty (414.01,V45.82) (I25.10,Z98.61)   3. Carotid bruit (785.9) (R09.89)   4. Chronic systolic congestive heart failure (428.22,428.0) (I50.22)   5. Diabetes mellitus (250.00) (E11.9)   6. REYES (dyspnea on exertion) (786.09) (R06.09)   7. Encounter to discuss test results (V65.49) (Z71.2)   8. Former smoker (V15.82) (Z38.147)   · quit smoking around age 28 smoked up to 1/2 PPD x 10 years   5. High risk medication use (V58.69) (Z79.899)   10. Hyperlipidemia (272.4) (E78.5)   11. Hypertension (401.9) (I10)   12. Hypothyroidism (244.9) (E03.9)   Followed by Dr. Sylwia Cadena   13. ICD (implantable cardioverter-defibrillator), dual, in situ (V45.02) (Z95.810)   14. Ischemic cardiomyopathy (414.8) (I25.5)   15. Leg pain, bilateral (729.5) (M79.604,M79.605)   16. Paroxysmal ventricular tachycardia (427.1) (I47.2)   17. Past myocardial infarction (412) (I25.2)   18. Peripheral vascular disease (443.9) (I73.9)   Hx PTA Right AT and PT 3/15 [facility] Dr Stan Meza   19. Renal insufficiency (593.9) (N28.9)   20. S/P CABG (coronary artery bypass graft) (V45.81) (Z95.1)   s/p CABG  x 4V , L-> LAD, S-> LCX, Diag, RCA    Family History   1. Family history of CABG (CABG) : Brother   2. Family history of  : Mother, Father   3. Family history of acute myocardial infarction (V17.3) (Z82.49) : Brother   4. Family history of cardiac disorder (V17.49) (Z82.49) : Mother, Father   5.  Family history of type 2 diabetes mellitus (V18.0) (Z83.3) : Maternal Grandmother    Social History   · Caffeine use (V49.89) (Z78.9)   · Former smoker (V15.82) (R51.240)   · No drug use   · Rarely consumes alcohol (V49.89) (Z78.9)    Current Meds   1. Amiodarone HCl - 100 MG Oral Tablet; TAKE 1 TABLET DAILY; Therapy: (Recorded:29May2019) to Recorded   2. Aspirin 81 MG Oral Tablet Delayed Release; 1 tablet daily; Therapy: 37FNQ4417 to (Evaluate:23Mar2019)  Requested for: 02Apr2018; Last   Rx:28Mar2018 Ordered   3. Carvedilol 25 MG Oral Tablet; Take one tablet by mouth two times a day; Therapy: (Carlos Tony) to Recorded   4. Clopidogrel Bisulfate 75 MG Oral Tablet; TAKE 1 TABLET DAILY; Therapy: 16AEB4615 to (Evaluate:07Oct2017) Recorded   5. Ferrous Gluconate 324 MG TABS; TAKE 1 TABLET TWICE DAILY; Therapy: (Recorded:08Vcx5336) to Recorded   6. Furosemide 20 MG Oral Tablet; take half tab daily; Therapy: (Carlos Tony) to Recorded   7. Gabapentin 400 MG Oral Capsule; TAKE 1 CAPSULE 3 TIMES DAILY; Therapy: (Recorded:49Rjg5616) to Recorded   8. Isosorbide Mononitrate ER 60 MG Oral Tablet Extended Release 24 Hour; TAKE 1   TABLET ONCE DAILY; Therapy: (Recorded:23Hiv0738) to Recorded   9. Lantus 100 UNIT/ML Subcutaneous Solution; Inject 12 units at bedtime; Therapy: (Recorded:29May2019) to Recorded   10. Levothyroxine Sodium 112 MCG Oral Tablet; Take one tablet by mouth every day; Therapy: 53EII1260 to (Evaluate:22Jun2017); Last Rx:27Jun2016 Ordered   11. Magnesium Oxide 400 MG Oral Capsule; TAKE 1 TABLET TWICE A DAY; Therapy: (Recorded:30Pxl7946) to Recorded   12. Nitroglycerin 0.4 MG Sublingual Tablet Sublingual; PLACE 1 TABLET UNDER THE    TONGUE EVERY 5 MINUTES UP TO 3 DOSES AS NEEDED FOR CHEST PAIN;    Therapy: 35SFQ9548 to (Evaluate:03Jul2017)  Requested for: 45PWK6823; Last    Rx:05Jan2017 Ordered   13. Rosuvastatin Calcium 40 MG Oral Tablet; TAKE 1 TABLET DAILY;     Therapy: (Recorded:31Rsx5470) to Recorded    Reviewed medications with med list.     Allergies   1. ACE Inhibitors   Intolerance; Recorded By: Romayne Babinski; 04/10/2017 10:12:17 AM   2. Altace CAPS   Cough; Recorded By: Zenovia Paget; 06/03/2004 3:04:37 PM   3. Ranexa TB12   Vomiting; Recorded By: Jonh Hart; 11/26/2010 4:32:18 PM   4. Lipitor TABS   Recorded By: Sue Weldon; 01/25/2013 11:33:59 AM   5. Penicillins   Recorded By: Viktor Temple; 06/26/1998 8:33:06 AM    Immunizations  PCV --- Bassett Starks: 06-Mar-2017   PPSV --- Bassett Starks: 01-Jan-2007   Varicella --- Series1: 01-Jan-2013     Vitals  Vital Signs   Recorded: 76QEO8072 01:48PM   Height: 5 ft 10.5 in  Weight: 190 lb   BMI Calculated: 26.88  BSA Calculated: 2.05  Blood Pressure: 110 / 60, RUE, Sitting  Heart Rate: 62, Apical  Falls Screening: One fall without injury in the past year    Pt's weight obtained with shoes. Procedure    EKG done in office today; :   .     Assessment   1. REYES (dyspnea on exertion) (786.09) (R06.09)   2. CAD S/P percutaneous coronary angioplasty (414.01,V45.82) (I25.10,Z98.61)   3. Paroxysmal ventricular tachycardia (427.1) (I47.2)   4. Past myocardial infarction (412) (I25.2)   5. S/P CABG (coronary artery bypass graft) (V45.81) (Z95.1)   6. Ischemic cardiomyopathy (414.8) (I25.5)   7. ICD (implantable cardioverter-defibrillator), dual, in situ (V45.02) (Z95.810)   8. Chronic systolic congestive heart failure (428.22,428.0) (I50.22)   9. Arteriosclerosis of carotid artery (433.10) (I65.29)   10. Hypertension (401.9) (I10)   11. Hyperlipidemia (272.4) (E78.5)   12. Diabetes mellitus (250.00) (E11.9)   13. Peripheral vascular disease (443.9) (I73.9)   14. Renal insufficiency (593.9) (N28.9)   15. History of Overweight (278.02) (E66.3)   16. High risk medication use (V58.69) (Z79.899)   17. Former smoker (V15.82) (V94.985)    Plan   1. Renew: Nitroglycerin 0.4 MG Sublingual Tablet Sublingual (Nitrostat);  PLACE 1 TABLET   UNDER THE TONGUE EVERY 5 MINUTES UP TO 3 DOSES AS NEEDED   FOR CHEST PAIN   2. Basic Metabolic Panel; Status:Active; Requested for:29Nov2019;   Call if Potassium result is <3.5 or >5.0 : YES   3. CBC; Status:Active; Requested for:29Nov2019;    4. EKG at next office visit.; Status:Active; Requested for:29May2019;    5. ESR; Status:Active; Requested for:29Nov2019;    6. High Sensitivity CRP; Status:Active; Requested for:29Nov2019;    7. Lipid Panel w/Reflex LDL; Status:Active; Requested for:29Nov2019;    8. LIVER PANEL; Status:Active; Requested for:29Nov2019;    9. TSH; Status:Active; Requested for:29Nov2019;    10. Access your medical record, request prescriptions, view laboratory and testing done in    our office, request appointments, view immunization records and message your provider    through our safe and secure patient portal. Ask a staff member how    to register or visit us at [email or web address] to get started today.;    Status:Complete;   Done: 60IOI0838   11. Drink plenty of fluids.; Status:Active; Requested for:29May2019;    12. Please bring all medicines, vitamins, and herbal supplements with you when you come    to the office.; Status:Active; Requested for:29May2019;    13. Prescriptions will not be filled unless you are compliant with your follow up appointments    or have a follow up appointments scheduled as per the instruction of your physician. Refills for medications should be requested at the time of your office visit.; Status:Active; Requested for:29May2019;    14. Thank you for being a patient at [facility]. Our goal is to    provide high quality medical care. Following today's visit, please check your email for an    invitation to complete our patient satisfaction survey. By sharing your feedback, you will    help us continue our mission to provide excellent medical care.  Your participation in the    survey is voluntary and completely confidential. We appreciate your thoughts regarding    today's visit.; Status:Active; Requested for:72Aht4714;    15. Tobacco use Hx Reported; Status:Complete;   Done: 99LTN9891   16. 6 month follow-up/call if interval problems. Evaluation and Treatment  Follow-up  Status:    Active  Requested for: [id number]    Patient provided Falls Prevention education sheet. Message  Scribe:   IGOR Meccaalistair Ramey am scribing for, and in the presence of Dr. Hershal Collet, MD, Lyudmila Shaikh. Signatures  Electronically signed by : Joanne Powell MA; May 29 2019  1:49PM EST                        Electronically signed by : Ricky Pina CMA; May 29 2019  2:42PM EST                        Electronically signed by : Artis Aragon M.D.; 2019  5:02PM EST                          ===================================    CATH NOTE 19    [address]  [address] [facility], [address] [facility]     CARDIAC CATHETERIZATION        PATIENT NAME:        Edin       :                      1940  MED REC NO:              [id number]                         ACCOUNT NO:           [id number]      PROVIDER: Mich Sweeney MD     DATE OF PROCEDURE:  19     PROCEDURES:    Left heart catheterization,   Right heart catheterization,  [place]-Shefali right heart hemodynamics with saturation and cardiac output (Ross /thermodilution),  Selective coronary cineangiography,   Selective left ventriculography,  Conscious sedation.     INDICATIONS:   62-year-old male with known coronary artery disease status post previous myocardial infarctions coronary artery bypass grafting, severe ischemic cardiomyopathy post AICD now with ventricular tachycardia and AICD shocks and syncope. He also has moderate renal insufficiency with GFR approximately 30. Cardiac catheterization and the above procedures were recommended to guide therapy and possible surgical options. Risk goals and alternatives of the procedure were discussed in detail with the patient.   He understood and wished to proceed.     TECHNIQUE:  After obtaining informed consent, the patient was brought to  the catheterization laboratory, where the right groin was prepped and draped in the usual sterile fashion.  Using 1% local Xylocaine anesthesia, 4-Burkinan right femoral arterial sheath was placed without difficulty. A 7 [place] right femoral venous sheath was placed without difficulty. 7 Western Anitra [place]-Shefali balloon tipped catheter was used to perform right heart hemodynamics saturation and cardiac output with both Ross and thermodilution. [place]-Shefali catheter was placed in the pulmonary wedge position, pulmonary artery, right ventricle and right atrial positions. A 4-Burkinan pigtail catheter was placed across the aortic valve in the left ventricle and left ventriculography was performed using 20 mL of contrast.  The catheter was then exchanged to 4-Burkinan left and right Jo coronary catheters and left and right selective coronary cineangiography was performed using multiple angle views. At the end of the procedure, catheter and sheaths were removed.  Hemostasis was maintained using manual compression.  There were no complications.     CONTRAST: 65 mL.     CONSCIOUS SEDATION:       2 mg of intravenous Versed;   50 mg of intravenous Fentanyl.        RESULTS:       CARDIAC CATHETERIZATION:     Hemodynamics:                                                                              /7, /77.                                  AV gradient : None                                                                          Cardiac Output:  TD:   3.6 l/m   Right heart hemodynamics:                                                                                 Pressures:                               Sats ( % ):            PW:     Mean 27                                  95                    No Intracardiac Shunting Noted.   PA:      46/31 with mean of 38                        48  RV:      59/10, 22 52  RA:      22                                            55  FA:       _                                              93        Left ventriculography:    RICHARD left ventriculography revealed global severe left ventricular enlargement with global severe hypokinesia with more prominent inferior akinesia. Left ventricular ejection fraction overall was less than 20 %.    There was 1+ mitral regurgitation.     Coronary cineangiography:      Right dominant.     Left Main:    Prior left main and left circumflex stent,  patent.     Left anterior descendin% ostial proximal occlusion, chronic     Left circumflex:    Patent left main/left circumflex previous stent with distal left circumflex without significant disease with the exception of 100% flush obtuse marginal occlusion.     Right coronary artery:    100% proximal ostial occlusion, chronic     There were no fixed or reversible stenoses identified otherwise.     Bypass Graphs:  LIMA to LAD: Patent Graph without disease, Native Vessel without disease. SVG to LCX: Patent graft with prior mid to distal graft stenting with mid graft in-stent 95% hazy stenosis. SIMÓN III flow. Native distal circumflex obtuse marginal without significant disease. SVG to DIAG: Stump Chronic Occlusion  SVG to RCA: Stump Chronic Occlusion     IMPRESSION:     1.  Abormal cardiac catheterization. 2.  Severe coronary artery disease post previous bypass grafting. 3.  Severe native coronary artery disease with LAD and right coronary artery chronic occlusions. 4.  Prior coronary bypass surgery with 2 of 4 patent grafts (LIMA to the LAD; saphenous vein graft to left circumflex / OM; chronic SVG occlusions to diagonal and right coronary artery). 5.  SVG to OM graft in-stent restenosis 95% hazy as noted above. 6.  Severe global dilated cardiomyopathy with more profound inferior akinesia, LVEF< 20%. 7.  Moderate pulmonary hypertension  8.   No evidence of

## 2019-06-20 NOTE — PROGRESS NOTES
Jose Johnson is a 67 y.o. male patient. Chief complaint uncontrolled diabetes hypothyroidism  No current facility-administered medications for this encounter. Current Outpatient Medications   Medication Sig Dispense Refill    insulin glargine (LANTUS) 100 UNIT/ML injection vial 12 units bid 1 vial 3    levothyroxine (SYNTHROID) 137 MCG tablet Take 1 tablet by mouth Daily 30 tablet 3    carvedilol (COREG) 25 MG tablet Take 25 mg by mouth 2 times daily      isosorbide mononitrate (IMDUR) 120 MG CR tablet Take 120 mg by mouth daily Indications: patient taking 60 mg daily now.  gabapentin (NEURONTIN) 300 MG capsule Take 600 mg by mouth 3 times daily. Take one(1) tablet three times daily.  simvastatin (ZOCOR) 20 MG tablet Take by mouth      nitroGLYCERIN (NITROSTAT) 0.4 MG SL tablet Place 0.4 mg under the tongue      Magnesium Oxide 400 MG CAPS Take 400 mg by mouth      vitamin B-12 (CYANOCOBALAMIN) 500 MCG tablet Take one(1) tablet daily.  amiodarone (CORDARONE) 200 MG tablet Take 200 mg by mouth daily Indications: patient taking 100mg daily now.  aspirin 81 MG tablet Take 81 mg by mouth daily      clopidogrel (PLAVIX) 75 MG tablet Take 75 mg by mouth daily      docusate sodium (COLACE) 100 MG capsule Take 100 mg by mouth 2 times daily      ferrous sulfate 325 (65 FE) MG tablet Take 325 mg by mouth daily (with breakfast)      pantoprazole (PROTONIX) 40 MG tablet Take 40 mg by mouth       Allergies   Allergen Reactions    Ranolazine Anaphylaxis    Altace [Ramipril]     Lipitor [Atorvastatin]     Lisinopril Hives    Penicillins      Active Problems:    AICD discharge  Resolved Problems:    * No resolved hospital problems. *    Blood pressure (!) 103/49, pulse 57, temperature 98.2 °F (36.8 °C), temperature source Oral, resp. rate 18, height 5' 11\" (1.803 m), weight 205 lb 7.5 oz (93.2 kg), SpO2 100 %. Subjective:  Symptoms:  Stable. Diet:  Adequate intake. Objective:  General Appearance:  Comfortable. Vital signs: (most recent): Blood pressure (!) 103/49, pulse 57, temperature 98.2 °F (36.8 °C), temperature source Oral, resp. rate 18, height 5' 11\" (1.803 m), weight 205 lb 7.5 oz (93.2 kg), SpO2 100 %. Vital signs are normal.    Lungs:  Breath sounds clear to auscultation. Heart: S1 normal and S2 normal.    Extremities: Normal range of motion. Neurological: Patient is alert. Skin:  Warm. Results for Amee Pilling (MRN [id number]) as of 6/20/2019 09:57   Ref. Range 6/19/2019 10:45 6/19/2019 13:23 6/19/2019 14:24 6/19/2019 15:54   Glucose Latest Ref Range: 70 - 99 mg/dL  169 (H)     POC Glucose Latest Ref Range: 60 - 115 mg/dl 168 (H)   176 (H)     Results for Amee Pilling (MRN [id number]) as of 6/20/2019 09:57   Ref. Range 6/19/2019 13:23   Sodium Latest Ref Range: 135 - 144 mEq/L 138   Potassium Latest Ref Range: 3.4 - 4.9 mEq/L 4.0   Chloride Latest Ref Range: 95 - 107 mEq/L 95   CO2 Latest Ref Range: 20 - 31 mEq/L 29   BUN Latest Ref Range: 8 - 23 mg/dL 34 (H)   Creatinine Latest Ref Range: 0.70 - 1.20 mg/dL 2.06 (H)   Anion Gap Latest Ref Range: 9 - 15 mEq/L 14   GFR Non- Latest Ref Range: >60  31.4 (L)   GFR  Latest Ref Range: >60  38.0 (L)   Glucose Latest Ref Range: 70 - 99 mg/dL 169 (H)   Calcium Latest Ref Range: 8.5 - 9.9 mg/dL 8.3 (L)       Assessment:    Condition: In stable condition. Improving. (Uncontrolled diabetes  Chronic kidney disease stage IV  Hypothyroidism worsened with amiodarone). Plan:   (Discontinue glipizide and metformin  Change Lantus to 12 units twice daily  Increase Synthroid to 137 mcg daily  Patient to follow-up in 4 weeks time  Patient education done).        Dat Jones MD  6/20/2019

## 2019-07-09 NOTE — PLAN OF CARE
[address]my [address]     Ph: [phone]  Fax: [phone]    [x] Certification  [] Recertification [x]  Plan of Care  [] Progress Note [] Discharge      To:  Dr. Kaiser Blake       From:  Alyce Cleaning, MELBA  Patient: Jose Fatima       : 1940  Diagnosis: weakness        Date: 2019  Treatment Diagnosis: imbalance, difficulty with ambulation, LE weakness     Plan of Care/Certification Expiration Date: 19  Progress Report Period from:  2019  to 2019    Total # of Visits to Date: 1   No Show: 0    Canceled Appointment: 0     OBJECTIVE:   Short Term Goals - Time Frame for Short term goals: 3 wks     Goals Current/Discharge status  Met   Short term goal 1: Independent with HEP   Need for HEP  [] yes  [] no   Short term goal 2: Ambulate with str cane > 500' with improved upright posture and decreased fatigue   Ambulates with strcane with decreased step length and upright posture, decreased balance with mild pathway deviations  [] yes  [] no   Short term goal 3: Improve LE strength 1/2 grade to improve ease with gait and stairs   Strength RLE  Strength RLE: Exception  R Hip Flexion: 4/5  R Hip Extension: 3-/5  R Hip ABduction: 4/5  R Hip Internal Rotation: 4/5  R Hip External Rotation: 4-/5  R Knee Flexion: 5/5  R Knee Extension: 4+/5  R Ankle Dorsiflexion: 4/5  R Ankle Inversion: 4/5  R Ankle Eversion: 4/5  Strength LLE  Strength LLE: Exception  L Hip Flexion: 4-/5  L Hip Extension: 3-/5  L Hip ABduction: 4/5  L Hip Internal Rotation: 3+/5  L Hip External Rotation: 3-/5  L Knee Flexion: 5/5  L Knee Extension: 5/5  L Ankle Dorsiflexion: 4/5  L Ankle Inversion: 4/5  L Ankle Eversion: 4/5  Strength RUE  Strength RUE: Exception  R Shoulder Flexion: 4+/5  R Shoulder Extension: 4+/5  R Shoulder ABduction: 4+/5  R Shoulder Internal Rotation: 4/5  R Shoulder External Rotation: 4-/5  R Elbow Flexion: 5/5  R Elbow Extension: 5/5  R Forearm Pron: 4+/5  R Forearm Sup: 4+/5  R Wrist Flexion: 4+/5  R Wrist Extension: 4+/5  R Wrist Radial Deviation: 4+/5  R Wrist Ulnar Deviation: 4+/5  Strength LUE  Strength LUE: Exception  L Shoulder Flexion: 4+/5  L Shoulder Extension: 4+/5  L Shoulder ABduction: 4+/5  L Shoulder Internal Rotation: 4+/5  L Shoulder External Rotation: 4/5  L Elbow Flexion: 5/5  L Elbow Extension: 5/5  L Forearm Pron: 4+/5  L Forearm Sup: 4+/5  L Wrist Flexion: 4+/5  L Wrist Extension: 4+/5  L Wrist Radial Deviation: 4+/5  L Wrist Ulnar Deviation: 4+/5      [] yes  [] no     Long Term Goals - Time Frame for Long term goals : 6 wks   Goals Current/ Discharge status Met   Long term goal 1: Improve LE strength >/= 4+/5 to allow return to previous activity  See above  [] yes  [] no   Long term goal 2: Ambulate >/= 500' without AD independently with good posture and equal step length  Ambulates without AD with bilateral Trendelenburg with decreased nola, wide JULIA, minimal trunk rotation and arm swing, decreased foot clearance, mild decreased Lt stance  [] yes  [] no   Long term goal 3: up/ down flight of stairs with 1 HR independently  2 HRs with non-reciprocal pattern  [] yes  [] no   Long term goal 4: Subramanian >/= 45/56 to demonstrate improved static balance and decreased risk for falls  Subramanian Balance Score: 34   [] yes  [] no     Body structures, Functions, Activity limitations: Decreased functional mobility , Decreased ROM, Decreased strength, Decreased balance, Decreased sensation, Decreased endurance  Assessment: Pt presents with weakness and deconditioning due to prolonged hospital stays and inactivity. Pt denies pain. Presents with walking shoe Lt foot due to toe amputation, which affects balance and posture. Pt with decreased static and dynamic balance. Noted hip weakness bilaterally. Pt fatigues quickly.   Pt would benefit from skilled PT to address deficits and return to previous function. Prognosis: Good    New Education Provided: HEP, POC     PLAN: [x] Evaluate and Treat  Frequency/Duration:  Plan  Times per week: 2  Plan weeks: 6-8  Current Treatment Recommendations: Strengthening, ROM, Balance Training, Functional Mobility Training, Transfer Training, Endurance Training, Gait Training, Stair training, Neuromuscular Re-education, Manual Therapy - Soft Tissue Mobilization, Home Exercise Program, Safety Education & Training, Patient/Caregiver Education & Training, Equipment Evaluation, Education, & procurement, Aquatics     Precautions: falls                   Patient Status:[x] Continue/ Initiate plan of Care    [] Discharge PT. Recommend pt continue with HEP. [] Additional visits requested, Please re-certify for additional visits:        Signature: Electronically signed by Luisana Walls PT on 7/9/19 at 12:15 PM    If you have any questions or concerns, please don't hesitate to call. Thank you for your referral.    I have reviewed this plan of care and certify a need for medically necessary rehabilitation services.     Physician Signature:__________________________________________________________  Date:  Please sign and return

## 2019-07-09 NOTE — PROGRESS NOTES
tablet Take 40 mg by mouth      simvastatin (ZOCOR) 20 MG tablet Take by mouth      nitroGLYCERIN (NITROSTAT) 0.4 MG SL tablet Place 0.4 mg under the tongue      Magnesium Oxide 400 MG CAPS Take 400 mg by mouth      vitamin B-12 (CYANOCOBALAMIN) 500 MCG tablet Take one(1) tablet daily.  amiodarone (CORDARONE) 200 MG tablet Take 200 mg by mouth daily Indications: patient taking 100mg daily now.  aspirin 81 MG tablet Take 81 mg by mouth daily      clopidogrel (PLAVIX) 75 MG tablet Take 75 mg by mouth daily      docusate sodium (COLACE) 100 MG capsule Take 100 mg by mouth 2 times daily      ferrous sulfate 325 (65 FE) MG tablet Take 325 mg by mouth daily (with breakfast)       No current facility-administered medications on file prior to encounter. Subjective  Subjective: Pt was admitted to hospital with LE stent placement. 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 Pt was sedentary x2 mos. Pt with syncope while mowing the lawn in early June. Pt did not sustain any injuries, but was transported to hospital due to cardiac issues. Attempted cardiac cath and transported to [facility]. Cardiac cath there and hospitalized x10 days. Pt now reports generalized weakness and deconditioning. Pt reports difficulty with ambulation. Now utilizing ww outside of home and for increased distances. Using cane for shorter distances, primarily without AD in the house. Pt reports increased difficulty maintaining upright posture with fatigue. Pt denies falls.     Additional Pertinent Hx: HTN, PVD, DM, CAD, PPM, defibrillator   Pain Screening  Patient Currently in Pain: No  Pain Assessment  Pain Assessment: 0-10  Pain Level: 0    Social/Functional History  Lives With: Spouse  Type of Home: House  Home Layout: Two level, Bed/Bath upstairs(6 steps to bedroom with 0 HR, currently staying on 1st floor )  Home Access: Stairs to enter with rails  Entrance Stairs - Number of Steps: 4 with 1 HR   ADL Assistance: Independent(sponge bathing on 1st floor due to unable to get foot wet and shower is upstairs )  Homemaking Assistance: Needs assistance(no longer doing yardwork after recent syncopal episode )  Ambulation Assistance: Independent(with str cane, ww or without AD; without AD prior to April )  Transfer Assistance: Independent  Active : Yes(minimal since surgery )  Mode of Transportation: Car  Occupation: Retired  Type of occupation: retired baron -   Leisure & Hobbies: golf - not for 2-3 yrs, working out at [employer] 5-6 days per week  IADL Comments: pt reports functioning less than 20% of normal          Objective  Vision  Vision: Within Functional Limits  Hearing  Hearing: Within functional limits  Observation/Palpation  Posture: Fair(flattened spinal curvatures, fwd flexed at hips, mild asymmetry)  Observation: wearing Lt boot and dressing    Strength RLE  Strength RLE: Exception  R Hip Flexion: 4/5  R Hip Extension: 3-/5  R Hip ABduction: 4/5  R Hip Internal Rotation: 4/5  R Hip External Rotation: 4-/5  R Knee Flexion: 5/5  R Knee Extension: 4+/5  R Ankle Dorsiflexion: 4/5  R Ankle Inversion: 4/5  R Ankle Eversion: 4/5  Strength LLE  Strength LLE: Exception  L Hip Flexion: 4-/5  L Hip Extension: 3-/5  L Hip ABduction: 4/5  L Hip Internal Rotation: 3+/5  L Hip External Rotation: 3-/5  L Knee Flexion: 5/5  L Knee Extension: 5/5  L Ankle Dorsiflexion: 4/5  L Ankle Inversion: 4/5  L Ankle Eversion: 4/5  Strength RUE  Strength RUE: Exception  R Shoulder Flexion: 4+/5  R Shoulder Extension: 4+/5  R Shoulder ABduction: 4+/5  R Shoulder Internal Rotation: 4/5  R Shoulder External Rotation: 4-/5  R Elbow Flexion: 5/5  R Elbow Extension: 5/5  R Forearm Pron: 4+/5  R Forearm Sup: 4+/5  R Wrist Flexion: 4+/5  R Wrist Extension: 4+/5  R Wrist Radial Deviation: 4+/5  R Wrist Ulnar Deviation: 4+/5  Strength LUE  Strength LUE: Exception  L Shoulder Flexion: 4+/5  L Shoulder Extension: 4+/5  L Shoulder ABduction: 4+/5  L Shoulder 10: ** rocker  Exercise 11: ** step ups   Exercise 20: HEP: prone lying, 3 way SLR    ** indicates treatment to be performed at future treatment     POST-PAIN    Pain Rating (0-10 pain scale): 0  Location and Pain Description same as pre-pain unless otherwise indicated. Action: [x] NA  [] Call Physician  [] Perform HEP  [] Meds as prescribed     Assessment   Conditions Requiring Skilled Therapeutic Intervention  Body structures, Functions, Activity limitations: Decreased functional mobility , Decreased ROM, Decreased strength, Decreased balance, Decreased sensation, Decreased endurance  Assessment: Pt presents with weakness and deconditioning due to prolonged hospital stays and inactivity. Pt denies pain. Presents with walking shoe Lt foot due to toe amputation, which affects balance and posture. Pt with decreased static and dynamic balance. Noted hip weakness bilaterally. Pt fatigues quickly. Pt would benefit from skilled PT to address deficits and return to previous function. Treatment Diagnosis: imbalance, difficulty with ambulation, LE weakness   Prognosis: Good  Decision Making: Medium Complexity  History: personal factors: age, sedentary; contributing PMH: HTN, PVD, DM, CAD, PPM, defibrillator   Exam: musculoskeletal, cardiac, circulatory, sensory systems involved impacting strength, ROM, gait, balance; Subramanian/56   Clinical Presentation: complicated   Patient Education: HEP, POC   Barriers to Learning: no   REQUIRES PT FOLLOW UP: Yes    Patient Education   Patient Education: HEP, POC     Pt verbalized/demonstrated good understanding:     [x] Yes         [] No, pt required further clarification.     Goals   Patient goal: Patient goals : \"get muscle and strength back\"     Short term goals  Time Frame for Short term goals: 3 wks   Short term goal 1: Independent with HEP   Short term goal 2: Ambulate with str cane > 500' with improved upright posture and decreased fatigue   Short term goal 3: Improve LE

## 2019-07-24 NOTE — ED PROVIDER NOTES
Socioeconomic History    Marital status:      Spouse name: Not on file    Number of children: Not on file    Years of education: Not on file    Highest education level: Not on file   Occupational History    Not on file   Social Needs    Financial resource strain: Not on file    Food insecurity:     Worry: Not on file     Inability: Not on file    Transportation needs:     Medical: Not on file     Non-medical: Not on file   Tobacco Use    Smoking status: Never Smoker   Substance and Sexual Activity    Alcohol use: No    Drug use: Not on file    Sexual activity: Not on file   Lifestyle    Physical activity:     Days per week: Not on file     Minutes per session: Not on file    Stress: Not on file   Relationships    Social connections:     Talks on phone: Not on file     Gets together: Not on file     Attends Nondenominational service: Not on file     Active member of club or organization: Not on file     Attends meetings of clubs or organizations: Not on file     Relationship status: Not on file    Intimate partner violence:     Fear of current or ex partner: Not on file     Emotionally abused: Not on file     Physically abused: Not on file     Forced sexual activity: Not on file   Other Topics Concern    Not on file   Social History Narrative    Not on file       SCREENINGS             PHYSICAL EXAM    (up to 7 for level 4, 8 or more for level 5)     ED Triage Vitals   BP Temp Temp src Pulse Resp SpO2 Height Weight   -- -- -- -- -- -- -- --       Physical Exam   Constitutional: He appears well-developed and well-nourished. He appears distressed. Patient in cardiopulmonary arrest on arrival.   HENT:   Head: Normocephalic and atraumatic. Right Ear: External ear normal.   Left Ear: External ear normal.   Eyes:   Fixed, Midrange. Neck: Neck supple. Cardiovascular:   Patient with no spontaneous cardiac activity, no pulse appreciated. Pulmonary/Chest:   No pulmonary effort by patient.

## 2019-07-26 ENCOUNTER — HOSPITAL ENCOUNTER (OUTPATIENT)
Dept: PHYSICAL THERAPY | Age: 79
Setting detail: THERAPIES SERIES
End: 2019-07-26
Payer: MEDICARE

## 2019-07-30 ENCOUNTER — APPOINTMENT (OUTPATIENT)
Dept: PHYSICAL THERAPY | Age: 79
End: 2019-07-30
Payer: MEDICARE

## 2019-10-22 NOTE — PROGRESS NOTES
[address]       Physical Therapy  Cancellation/No-show Note  Patient Name:  Qiana Barajas  :  1940   Date:  2019  Referring Practitioner: Dr. Diallo  Diagnosis: weakness     Visit Information:  PT Visit Information  PT Insurance Information: Medicare/ [facility]   Total # of Visits Approved: (follow medicare guidelines )  Total # of Visits to Date: 4  Plan of Care/Certification Expiration Date: 19  No Show: 0  Progress Note Due Date: 19  Canceled Appointment: 1  Progress Note Counter: 4/10 cxl     For today's appointment patient:  [x]  Cancelled  []  Rescheduled appointment  []  No-show   []  Called pt to remind of next appointment     Reason given by patient:  []  Patient ill  []  Conflicting appointment  []  No transportation    []  Conflict with work  []  No reason given  [x]  Other: Low Blood pressure      Comments:       Signature: Electronically signed by Mateo Alberts PTA on 19 at 12:29 PM Stable

## 2022-07-24 NOTE — FLOWSHEET NOTE
Perfect serve to hospitalist to discuss pt case.  Electronically signed by Maty Forte RN on 6/18/2019 at 7:06 AM 4 = No assist / stand by assistance

## 2023-02-10 NOTE — PLAN OF CARE
Notified regarding mildly elevated D-dimer. It is unknown why the D-dimer was ordered but it is suspected that it was ordered by cardiology to rule out PE given the enlarged RV and syncope. Patient has history of CKD and the plan is for cath in the morning. Given the elevated Creatinine, CTA is not advisable. Given the elevated D-dimer and because of the difficulty in doing VQ-scan or DVT US at night, will start heparin drip until the patient is reassessed by cardiology and the primary hospitalist in the morning to decide if there is a need to order any test or just discontinue heparin.      Electronically signed by Dieter Michaud MD on 6/17/2019 at 9:41 PM Dual antiplatelet therapy/IMPROVE-DD Application Not Available